# Patient Record
Sex: MALE | Race: WHITE | NOT HISPANIC OR LATINO | URBAN - METROPOLITAN AREA
[De-identification: names, ages, dates, MRNs, and addresses within clinical notes are randomized per-mention and may not be internally consistent; named-entity substitution may affect disease eponyms.]

---

## 2021-04-05 DIAGNOSIS — Z23 ENCOUNTER FOR IMMUNIZATION: ICD-10-CM

## 2021-04-18 ENCOUNTER — IMMUNIZATIONS (OUTPATIENT)
Dept: FAMILY MEDICINE CLINIC | Facility: HOSPITAL | Age: 56
End: 2021-04-18

## 2021-04-18 DIAGNOSIS — Z23 ENCOUNTER FOR IMMUNIZATION: Primary | ICD-10-CM

## 2021-04-18 PROCEDURE — 0001A SARS-COV-2 / COVID-19 MRNA VACCINE (PFIZER-BIONTECH) 30 MCG: CPT

## 2021-04-18 PROCEDURE — 91300 SARS-COV-2 / COVID-19 MRNA VACCINE (PFIZER-BIONTECH) 30 MCG: CPT

## 2021-05-15 ENCOUNTER — IMMUNIZATIONS (OUTPATIENT)
Dept: FAMILY MEDICINE CLINIC | Facility: HOSPITAL | Age: 56
End: 2021-05-15

## 2021-05-15 DIAGNOSIS — Z23 ENCOUNTER FOR IMMUNIZATION: Primary | ICD-10-CM

## 2021-05-15 PROCEDURE — 91300 SARS-COV-2 / COVID-19 MRNA VACCINE (PFIZER-BIONTECH) 30 MCG: CPT

## 2021-05-15 PROCEDURE — 0002A SARS-COV-2 / COVID-19 MRNA VACCINE (PFIZER-BIONTECH) 30 MCG: CPT

## 2021-11-10 ENCOUNTER — HOSPITAL ENCOUNTER (OUTPATIENT)
Facility: HOSPITAL | Age: 56
Setting detail: HOSPITAL OUTPATIENT SURGERY
End: 2021-11-10
Attending: ORTHOPAEDIC SURGERY | Admitting: ORTHOPAEDIC SURGERY
Payer: COMMERCIAL

## 2021-11-23 ENCOUNTER — TRANSCRIBE ORDERS (OUTPATIENT)
Dept: SCHEDULING | Age: 56
End: 2021-11-23
Payer: COMMERCIAL

## 2021-11-23 DIAGNOSIS — Z11.59 ENCOUNTER FOR SCREENING FOR OTHER VIRAL DISEASES: Primary | ICD-10-CM

## 2021-12-14 VITALS — WEIGHT: 185 LBS | HEIGHT: 68 IN | BODY MASS INDEX: 28.04 KG/M2

## 2021-12-21 ENCOUNTER — OFFICE VISIT (OUTPATIENT)
Dept: PREADMISSION TESTING | Facility: HOSPITAL | Age: 56
End: 2021-12-21
Attending: ORTHOPAEDIC SURGERY
Payer: COMMERCIAL

## 2021-12-21 ENCOUNTER — HOSPITAL ENCOUNTER (OUTPATIENT)
Dept: CARDIOLOGY | Facility: HOSPITAL | Age: 56
Discharge: HOME | End: 2021-12-21
Attending: HOSPITALIST
Payer: COMMERCIAL

## 2021-12-21 VITALS
BODY MASS INDEX: 28.93 KG/M2 | OXYGEN SATURATION: 100 % | HEART RATE: 98 BPM | SYSTOLIC BLOOD PRESSURE: 124 MMHG | TEMPERATURE: 98.7 F | DIASTOLIC BLOOD PRESSURE: 78 MMHG | WEIGHT: 190.9 LBS | HEIGHT: 68 IN | RESPIRATION RATE: 18 BRPM

## 2021-12-21 DIAGNOSIS — Z72.0 TOBACCO USE: ICD-10-CM

## 2021-12-21 DIAGNOSIS — H35.30 MACULAR DEGENERATION, UNSPECIFIED LATERALITY, UNSPECIFIED TYPE: ICD-10-CM

## 2021-12-21 DIAGNOSIS — Z01.818 PRE-OP EVALUATION: ICD-10-CM

## 2021-12-21 DIAGNOSIS — D75.89 MACROCYTOSIS WITHOUT ANEMIA: ICD-10-CM

## 2021-12-21 DIAGNOSIS — M17.12 PRIMARY OSTEOARTHRITIS OF LEFT KNEE: ICD-10-CM

## 2021-12-21 DIAGNOSIS — Z01.818 PRE-OP EVALUATION: Primary | ICD-10-CM

## 2021-12-21 LAB
ABO + RH BLD: NORMAL
ANION GAP SERPL CALC-SCNC: 11 MEQ/L (ref 3–15)
APTT PPP: 31 SEC (ref 23–35)
ATRIAL RATE: 91
BLD GP AB SCN SERPL QL: NEGATIVE
BUN SERPL-MCNC: 11 MG/DL (ref 8–20)
CALCIUM SERPL-MCNC: 10.1 MG/DL (ref 8.9–10.3)
CHLORIDE SERPL-SCNC: 102 MEQ/L (ref 98–109)
CO2 SERPL-SCNC: 26 MEQ/L (ref 22–32)
CREAT SERPL-MCNC: 0.7 MG/DL (ref 0.8–1.3)
D AG BLD QL: POSITIVE
ERYTHROCYTE [DISTWIDTH] IN BLOOD BY AUTOMATED COUNT: 13 % (ref 11.6–14.4)
GFR SERPL CREATININE-BSD FRML MDRD: >60 ML/MIN/1.73M*2
GLUCOSE SERPL-MCNC: 96 MG/DL (ref 70–99)
HCT VFR BLDCO AUTO: 46.2 % (ref 40.1–51)
HGB BLD-MCNC: 15.8 G/DL (ref 13.7–17.5)
INR PPP: 1
LABORATORY COMMENT REPORT: NORMAL
MCH RBC QN AUTO: 35.5 PG (ref 28–33.2)
MCHC RBC AUTO-ENTMCNC: 34.2 G/DL (ref 32.2–36.5)
MCV RBC AUTO: 103.8 FL (ref 83–98)
P AXIS: 73
PDW BLD AUTO: 9.3 FL (ref 9.4–12.4)
PLATELET # BLD AUTO: 241 K/UL (ref 150–350)
POTASSIUM SERPL-SCNC: 4.8 MEQ/L (ref 3.6–5.1)
PR INTERVAL: 150
PROTHROMBIN TIME: 13.1 SEC (ref 12.2–14.5)
QRS DURATION: 92
QT INTERVAL: 380
QTC CALCULATION(BAZETT): 467
R AXIS: 37
RBC # BLD AUTO: 4.45 M/UL (ref 4.5–5.8)
SODIUM SERPL-SCNC: 139 MEQ/L (ref 136–144)
SPECIMEN EXP DATE BLD: NORMAL
T WAVE AXIS: 34
VENTRICULAR RATE: 91
WBC # BLD AUTO: 4.9 K/UL (ref 3.8–10.5)

## 2021-12-21 PROCEDURE — 85610 PROTHROMBIN TIME: CPT | Performed by: HOSPITALIST

## 2021-12-21 PROCEDURE — 99204 OFFICE O/P NEW MOD 45 MIN: CPT | Performed by: HOSPITALIST

## 2021-12-21 PROCEDURE — 85027 COMPLETE CBC AUTOMATED: CPT | Performed by: HOSPITALIST

## 2021-12-21 PROCEDURE — 3008F BODY MASS INDEX DOCD: CPT | Performed by: HOSPITALIST

## 2021-12-21 PROCEDURE — 86901 BLOOD TYPING SEROLOGIC RH(D): CPT

## 2021-12-21 PROCEDURE — 80048 BASIC METABOLIC PNL TOTAL CA: CPT | Performed by: HOSPITALIST

## 2021-12-21 PROCEDURE — 36415 COLL VENOUS BLD VENIPUNCTURE: CPT | Performed by: HOSPITALIST

## 2021-12-21 PROCEDURE — 86900 BLOOD TYPING SEROLOGIC ABO: CPT

## 2021-12-21 PROCEDURE — 93005 ELECTROCARDIOGRAM TRACING: CPT

## 2021-12-21 PROCEDURE — 85730 THROMBOPLASTIN TIME PARTIAL: CPT | Performed by: HOSPITALIST

## 2021-12-21 ASSESSMENT — PAIN SCALES - GENERAL: PAINLEVEL: 2

## 2021-12-21 NOTE — ASSESSMENT & PLAN NOTE
L TKA planned for 1/4/2022  See below for statement in regards to perioperative risk  The patient was instructed to stop taking any NSAIDS per the surgeon's discretion  Recommend DVT prophylaxis at the discretion of the surgeon  Incentive spirometry and early ambulation post op  Post op bowel regimen  If present, recommend removal of Loredo catheter as early as possible to prevent infection  Monitor CBC, BMP, and volume status in the perioperative period

## 2021-12-21 NOTE — CONSULTS
[facility] Medicine Service -  Pre-Operative Consultation       Patient Name: Julian Banda  Referring Surgeon: Dr. Ignacio Antoine    Reason for Referral: Pre-Operative Evaluation  Surgical Procedure: left total knee arthroplasty  Operative Date: 1/4/2022    PCP: Pt States, No Pcp        HISTORY OF PRESENT ILLNESS      Julian Banda is a 56 y.o. male presenting today to the [facility] at [place] for pre-operative evaluation prior to planned surgery.    Regarding the current issue:  Julian had his right hip replaced about 5 years ago.  This went great.  But he has had increasing issues with his left knee.  He especially has issues when he has to carry anything and there is increased weight on the knee.   He is not on pain meds.      Regarding the pre-op eval:  Overall, the patient has been feeling in good health without issues of chest pain/pressure, dyspnea, or recent hospitalizations/significant infections.     Functionally, the patient is able to ascend a flight of stairs with no dyspnea or chest pain.   Functional capacity is good,  > 4 METS.    The patient denies, on specific questioning, the following:  No history of MI, arrhythmia, valvular heart disease or CHF.  No history of PARTHA.  No history of DVT/PE.  No history of COPD.  No history of CVA or TIA.  No history of DM or insulin use.   No history of CKD.     No history of difficult airway/difficult intubation.  No history of adverse reaction to anesthesia in the past.    Additional comments in regards to the patient's additional medical history:    None      PAST MEDICAL AND SURGICAL HISTORY      Past Medical History:   Diagnosis Date   • Arthritis     Left Knee and Right Hip    • COVID-19 vaccine series completed     Pfizer X 3    • Macular degeneration        Past Surgical History:   Procedure Laterality Date   • JOINT REPLACEMENT Right 2016    Hip       MEDICATIONS        Current  "Outpatient Medications:   •  aflibercept (EYLEA VTRE), by intravitreal route. Every 4 weeks left eye then 4 weeks for right eye ongoing , Disp: , Rfl:   •  ascorbic acid (VITAMIN C ORAL), Take 1 tablet by mouth every morning., Disp: , Rfl:   •  folic acid/multivit-min/lutein (CENTRUM SILVER ORAL), Take 1 tablet by mouth every morning., Disp: , Rfl:   •  vit A/vit C/vit E/zinc/copper (PRESERVISION AREDS ORAL), Take 1 tablet by mouth every morning., Disp: , Rfl:   •  vitamin E acetate (VITAMIN E ORAL), Take 1 tablet by mouth every morning., Disp: , Rfl:     ALLERGIES      Patient has no known allergies.    FAMILY HISTORY        Denies any prior known family history of DVTs/PEs/clotting disorder    SOCIAL HISTORY      Social History     Tobacco Use   • Smoking status: Current Some Day Smoker     Types: Cigars   • Smokeless tobacco: Never Used   • Tobacco comment: Ocassional Cigar    Substance Use Topics   • Alcohol use: Yes     Comment: Daily 2 glasses Wine    • Drug use: Never       REVIEW OF SYSTEMS      All other systems reviewed and negative except as noted in HPI    PHYSICAL EXAMINATION      Visit Vitals  /78   Pulse 98   Temp 37.1 °C (98.7 °F) (Oral)   Resp 18   Ht 1.727 m (5' 8\")   Wt 86.6 kg (190 lb 14.4 oz)   SpO2 100%   BMI 29.03 kg/m²     Body mass index is 29.03 kg/m².    Physical Exam  General: well-appearing WM, nontoxic appearing, no acute distress  HEENT: Moist membranes, PERRL, anicteric sclera   Neck: supple, no JVD  Cardiac: RRR, no murmur, no rub   Lungs: clear bilaterally, no wheezing/rales/rhonchi  Abdomen: soft, NT/ND, +BS, no rebound/guarding   Extremities: no edema, distal perfusion intact  MSK: left knee crepitus on flexion/extension  Neuro: AAOx3, nonfocal. CN's intact grossly. No focal motor deficit. No sensory deficit.  Skin: no rash. Clean, dry, intact.   Psych: cooperative    LABS / EKG        Labs  Today's labs reviewed. No issues.     Lab Results   Component Value Date     " 12/21/2021    K 4.8 12/21/2021     12/21/2021    BUN 11 12/21/2021    GLUCOSE 96 12/21/2021    CREATININE 0.7 (L) 12/21/2021    WBC 4.90 12/21/2021    HGB 15.8 12/21/2021    HCT 46.2 12/21/2021     12/21/2021    INR 1.0 12/21/2021         ECG   Reviewed. 12/21/2021 -- NSR w/ PACs/PVCs    ASSESSMENT AND PLAN         Tobacco use  Pt is daily cigar smoker  Advised cessation, particularly in week leading up to surgery    Macular degeneration  Pt gets intra-vitreal injections every 4 weeks  Ongoing outpatient f/u    Primary osteoarthritis of left knee  L TKA planned for 1/4/2022  See below for statement in regards to perioperative risk  The patient was instructed to stop taking any NSAIDS per the surgeon's discretion  Recommend DVT prophylaxis at the discretion of the surgeon  Incentive spirometry and early ambulation post op  Post op bowel regimen  If present, recommend removal of Loredo catheter as early as possible to prevent infection  Monitor CBC, BMP, and volume status in the perioperative period    Macrocytosis without anemia  ; can be worked up as outpatient  Recheck post-op  Hgb normal       In regards to perioperative cardiac risk:  The patient denies any history of ischemic heart disease, denies any history of CHF, denies any history of CVA, is not on pre-operative treatment with insulin, and does not have a pre-operative creatinine > 2 mg/dL.   The Revised Cardiac Risk Index (RCRI) = 0 for this patient which indicates a 0.4% risk of major adverse cardiac event in the perioperative period for this intermediate risk procedure.     PARTHA screening:  STOP-BANG screening for obstructive sleep apnea = 2, which indicates the patient is low risk for having underlying PARTHA.     Additional comments:  - no meds on AM of surgery  - stop all supplements 2 weeks prior to OR       Please do not hesitate to contact [facility] during the upcoming hospitalization with any questions or concerns.     Martha Victor,  MD  12/22/2021

## 2021-12-21 NOTE — PRE-PROCEDURE INSTRUCTIONS
1. You will be called between 3pm-7pm one business day before your procedure to tell you where and when to report. If you do not receive a phone call by 7pm, please call the Admissions office at [phone].    2. Please report to Admissions or Short Procedure Unit on the day of your procedure.   Detailed directions will be given to you when you are called with arrival time.        3. Please follow the following fasting guidelines:     No solid food EIGHT HOURS prior to surgery.  Unlimited clear liquids, meaning water or PLAIN black coffee WITHOUT any milk, cream, sugar, or sweetener are permitted up to TWO HOURS prior to arrival at the hospital.    4. Early on the morning of the procedure please take your usual dose of the listed medications with a sip of water: no meds per Dr Victor  Hold supplements 2 weeks prior to surgery per Dr Victor      5. Other Instructions: You may brush your teeth the morning of the procedure. Rinse and spit, do not swallow.  Bring a list of your medications with dosages.  Use surgical wash as directed.     6. If you develop a cold, cough, fever, rash, or other symptom prior to the day of the procedure, please report it to your physician immediately.    7. If you need to cancel the procedure for any reason, please contact your physician.    8. Make arrangements to have someone drive you home from the procedure. If you have not arranged for transportation home, your surgery may be cancelled.     9. You may not take public transportation unless accompanied by a responsible person.    10. You may not drive a car or operate complex or potentially dangerous machinery for 24 hours following anesthesia and/or sedation.    11.  If it is medically necessary for you to have a longer stay, you will be informed as soon as the decision is made.    12. Only bring essential items to the hospital.  Do not wear or bring anything of value to the hospital including jewelry of any kind, money, or wallet. Do  not wear make-up or contact lenses.  DO NOT BRING MEDICATIONS FROM HOME unless instructed to do so. DO bring your hearing aids, glasses, and a case    13. No lotion, creams, powders, or oils on skin the morning of procedure     14. Dress in comfortable clothes.    15.  If instructed, please bring a copy of your Advanced Directive (Living Will/Durable Power of ) on the day of your procedure.     16. Patients need to quarantine from the time of PAT COVID test to day of surgery, regardless of COVID vaccine status.      17. Ensuring your safety at all times is a very important part of our [facility] Culture of Safety. After having surgery and sedation, you are at risk for falling and balance issues. Although you may feel awake, the effects of the medication can last up to 24 hours after anesthesia. If you need to use the bathroom during your recovery period, nursing staff will escort you there and stay with you to ensure your safety.    18. Refrain from drinking alcohol and smoking cigarettes for 24 hours prior to surgery.    19. Shower with antibacterial soap (Dial) the night before and morning of your procedure.  If your procedure indicates the need for CHG antiseptic wash (Bactoshield or Hibiclens), please use this instead and follow instructions as discussed at the time of your Pre-Admission Testing phone interview or visit.    Above instructions reviewed with patient and patient acknowledges understanding.    Form explained by: Kristan Marino RN

## 2021-12-22 PROBLEM — D75.89 MACROCYTOSIS WITHOUT ANEMIA: Status: ACTIVE | Noted: 2021-12-22

## 2021-12-30 ENCOUNTER — APPOINTMENT (OUTPATIENT)
Dept: LAB | Facility: HOSPITAL | Age: 56
End: 2021-12-30
Attending: ORTHOPAEDIC SURGERY
Payer: COMMERCIAL

## 2021-12-30 DIAGNOSIS — Z11.59 ENCOUNTER FOR SCREENING FOR OTHER VIRAL DISEASES: ICD-10-CM

## 2021-12-30 LAB — SARS-COV-2 RNA RESP QL NAA+PROBE: NEGATIVE

## 2021-12-30 PROCEDURE — C9803 HOPD COVID-19 SPEC COLLECT: HCPCS

## 2021-12-30 PROCEDURE — U0003 INFECTIOUS AGENT DETECTION BY NUCLEIC ACID (DNA OR RNA); SEVERE ACUTE RESPIRATORY SYNDROME CORONAVIRUS 2 (SARS-COV-2) (CORONAVIRUS DISEASE [COVID-19]), AMPLIFIED PROBE TECHNIQUE, MAKING USE OF HIGH THROUGHPUT TECHNOLOGIES AS DESCRIBED BY CMS-2020-01-R: HCPCS

## 2022-01-30 ENCOUNTER — APPOINTMENT (OUTPATIENT)
Dept: LAB | Facility: HOSPITAL | Age: 57
End: 2022-01-30
Attending: ORTHOPAEDIC SURGERY
Payer: COMMERCIAL

## 2022-02-08 ENCOUNTER — TRANSCRIBE ORDERS (OUTPATIENT)
Dept: SCHEDULING | Age: 57
End: 2022-02-08

## 2022-02-08 DIAGNOSIS — Z11.59 ENCOUNTER FOR SCREENING FOR OTHER VIRAL DISEASES: Primary | ICD-10-CM

## 2022-02-14 ASSESSMENT — PAIN SCALES - GENERAL: PAINLEVEL: 4

## 2022-02-18 ENCOUNTER — APPOINTMENT (OUTPATIENT)
Dept: LAB | Facility: HOSPITAL | Age: 57
End: 2022-02-18
Attending: ORTHOPAEDIC SURGERY
Payer: COMMERCIAL

## 2022-02-18 ENCOUNTER — OFFICE VISIT (OUTPATIENT)
Dept: PREADMISSION TESTING | Facility: HOSPITAL | Age: 57
End: 2022-02-18
Attending: ORTHOPAEDIC SURGERY
Payer: COMMERCIAL

## 2022-02-18 ENCOUNTER — HOSPITAL ENCOUNTER (OUTPATIENT)
Dept: CARDIOLOGY | Facility: HOSPITAL | Age: 57
Discharge: HOME | End: 2022-02-18
Attending: ORTHOPAEDIC SURGERY
Payer: COMMERCIAL

## 2022-02-18 ENCOUNTER — TRANSCRIBE ORDERS (OUTPATIENT)
Dept: SCHEDULING | Age: 57
End: 2022-02-18

## 2022-02-18 VITALS
RESPIRATION RATE: 16 BRPM | BODY MASS INDEX: 28.79 KG/M2 | TEMPERATURE: 98.8 F | DIASTOLIC BLOOD PRESSURE: 81 MMHG | HEIGHT: 68 IN | WEIGHT: 190 LBS | SYSTOLIC BLOOD PRESSURE: 132 MMHG | OXYGEN SATURATION: 98 % | HEART RATE: 94 BPM

## 2022-02-18 DIAGNOSIS — Z72.0 TOBACCO USE: ICD-10-CM

## 2022-02-18 DIAGNOSIS — Z01.818 PRE-OP EVALUATION: Primary | ICD-10-CM

## 2022-02-18 DIAGNOSIS — D75.89 MACROCYTOSIS WITHOUT ANEMIA: ICD-10-CM

## 2022-02-18 DIAGNOSIS — H35.30 MACULAR DEGENERATION, UNSPECIFIED LATERALITY, UNSPECIFIED TYPE: ICD-10-CM

## 2022-02-18 DIAGNOSIS — M17.12 UNILATERAL PRIMARY OSTEOARTHRITIS, LEFT KNEE: ICD-10-CM

## 2022-02-18 DIAGNOSIS — M17.12 PRIMARY OSTEOARTHRITIS OF LEFT KNEE: ICD-10-CM

## 2022-02-18 DIAGNOSIS — M17.12 UNILATERAL PRIMARY OSTEOARTHRITIS, LEFT KNEE: Primary | ICD-10-CM

## 2022-02-18 LAB
ABO + RH BLD: NORMAL
ANION GAP SERPL CALC-SCNC: 9 MEQ/L (ref 3–15)
APTT PPP: 28 SEC (ref 23–35)
BLD GP AB SCN SERPL QL: NEGATIVE
BUN SERPL-MCNC: 12 MG/DL (ref 8–20)
CALCIUM SERPL-MCNC: 9.5 MG/DL (ref 8.9–10.3)
CHLORIDE SERPL-SCNC: 101 MEQ/L (ref 98–109)
CO2 SERPL-SCNC: 27 MEQ/L (ref 22–32)
CREAT SERPL-MCNC: 0.8 MG/DL (ref 0.8–1.3)
D AG BLD QL: POSITIVE
ERYTHROCYTE [DISTWIDTH] IN BLOOD BY AUTOMATED COUNT: 13.2 % (ref 11.6–14.4)
GFR SERPL CREATININE-BSD FRML MDRD: >60 ML/MIN/1.73M*2
GLUCOSE SERPL-MCNC: 119 MG/DL (ref 70–99)
HCT VFR BLDCO AUTO: 43.2 % (ref 40.1–51)
HGB BLD-MCNC: 14.5 G/DL (ref 13.7–17.5)
INR PPP: 1
LABORATORY COMMENT REPORT: NORMAL
MCH RBC QN AUTO: 34.5 PG (ref 28–33.2)
MCHC RBC AUTO-ENTMCNC: 33.6 G/DL (ref 32.2–36.5)
MCV RBC AUTO: 102.9 FL (ref 83–98)
PDW BLD AUTO: 8.5 FL (ref 9.4–12.4)
PLATELET # BLD AUTO: 186 K/UL (ref 150–350)
POTASSIUM SERPL-SCNC: 4.9 MEQ/L (ref 3.6–5.1)
PROTHROMBIN TIME: 12.6 SEC (ref 12.2–14.5)
RBC # BLD AUTO: 4.2 M/UL (ref 4.5–5.8)
SODIUM SERPL-SCNC: 137 MEQ/L (ref 136–144)
SPECIMEN EXP DATE BLD: NORMAL
WBC # BLD AUTO: 4.1 K/UL (ref 3.8–10.5)

## 2022-02-18 PROCEDURE — 99214 OFFICE O/P EST MOD 30 MIN: CPT | Performed by: HOSPITALIST

## 2022-02-18 PROCEDURE — 36415 COLL VENOUS BLD VENIPUNCTURE: CPT

## 2022-02-18 PROCEDURE — 85730 THROMBOPLASTIN TIME PARTIAL: CPT

## 2022-02-18 PROCEDURE — 85027 COMPLETE CBC AUTOMATED: CPT

## 2022-02-18 PROCEDURE — 86901 BLOOD TYPING SEROLOGIC RH(D): CPT

## 2022-02-18 PROCEDURE — 3008F BODY MASS INDEX DOCD: CPT | Performed by: HOSPITALIST

## 2022-02-18 PROCEDURE — 85610 PROTHROMBIN TIME: CPT

## 2022-02-18 PROCEDURE — 80048 BASIC METABOLIC PNL TOTAL CA: CPT

## 2022-02-18 ASSESSMENT — PAIN SCALES - GENERAL: PAINLEVEL: 2

## 2022-02-18 NOTE — PRE-PROCEDURE INSTRUCTIONS
1. You will be called between 3pm-7pm one business day before your procedure to tell you where and when to report. If you do not receive a phone call by 7pm, please call the Admissions office at [phone].    2. Please report to Admissions or Short Procedure Unit on the day of your procedure.   Detailed directions will be given to you when you are called with arrival time.        3. Please follow the following fasting guidelines:     No solid food EIGHT HOURS prior to surgery.  Unlimited clear liquids, meaning water or PLAIN black coffee WITHOUT any milk, cream, sugar, or sweetener are permitted up to TWO HOURS prior to arrival at the hospital.    4. Early on the morning of the procedure please take your usual dose of the listed medications with a sip of water:  No medications on the morning of surgery as per Dr Victor    5. Other Instructions: You may brush your teeth the morning of the procedure. Rinse and spit, do not swallow.  Bring a list of your medications with dosages.  Use surgical wash as directed.     6. If you develop a cold, cough, fever, rash, or other symptom prior to the day of the procedure, please report it to your physician immediately.    7. If you need to cancel the procedure for any reason, please contact your physician.    8. Make arrangements to have someone drive you home from the procedure. If you have not arranged for transportation home, your surgery may be cancelled.     9. You may not take public transportation unless accompanied by a responsible person.    10. You may not drive a car or operate complex or potentially dangerous machinery for 24 hours following anesthesia and/or sedation.    11.  If it is medically necessary for you to have a longer stay, you will be informed as soon as the decision is made.    12. Only bring essential items to the hospital.  Do not wear or bring anything of value to the hospital including jewelry of any kind, money, or wallet. Do not wear make-up or  contact lenses.  DO NOT BRING MEDICATIONS FROM HOME unless instructed to do so. DO bring your hearing aids, glasses, and a case    13. No lotion, creams, powders, or oils on skin the morning of procedure     14. Dress in comfortable clothes.    15.  If instructed, please bring a copy of your Advanced Directive (Living Will/Durable Power of ) on the day of your procedure.     16. Patients need to quarantine from the time of PAT COVID test to day of surgery, regardless of COVID vaccine status.      17. Ensuring your safety at all times is a very important part of our [facility] Culture of Safety. After having surgery and sedation, you are at risk for falling and balance issues. Although you may feel awake, the effects of the medication can last up to 24 hours after anesthesia. If you need to use the bathroom during your recovery period, nursing staff will escort you there and stay with you to ensure your safety.    18. Refrain from drinking alcohol and smoking cigarettes for 24 hours prior to surgery.    19. Shower with antibacterial soap (Dial) the night before and morning of your procedure.  If your procedure indicates the need for CHG antiseptic wash (Bactoshield or Hibiclens), please use this instead and follow instructions as discussed at the time of your Pre-Admission Testing phone interview or visit.    Above instructions reviewed with patient and patient acknowledges understanding.    Form explained by: Luz Charles LPN

## 2022-02-18 NOTE — H&P (VIEW-ONLY)
[facility] Medicine Service -  Pre-Operative Consultation       Patient Name: Julian Banda  Referring Surgeon: Dr. Ignacio Antoine    Reason for Referral: Pre-Operative Evaluation  Surgical Procedure: left total knee arthroplasty  Operative Date: 3/1/2022    PCP: Pt States, No Pcp        HISTORY OF PRESENT ILLNESS      Julian Banda is a 56 y.o. male presenting today to the [facility] at [place] for pre-operative evaluation prior to planned surgery.    Regarding the current issue:  Julian had his right hip replaced about 5 years ago.  This went great.  But he has had increasing issues with his left knee.  He especially has issues when he has to carry anything and there is increased weight on the knee.   He is not on pain meds.      Julian was originally scheduled for surgery in early Jan 2022.  It was postponed due to the Omicron surge.  He has had no changes in his health since our PAT visit on 12/21/21.      Regarding the pre-op eval:  Overall, the patient has been feeling in good health without issues of chest pain/pressure, dyspnea, or recent hospitalizations/significant infections.     Functionally, the patient is able to ascend a flight of stairs with no dyspnea or chest pain.   Functional capacity is good,  > 4 METS.    The patient denies, on specific questioning, the following:  No history of MI, arrhythmia, valvular heart disease or CHF.  No history of PARTHA.  No history of DVT/PE.  No history of COPD.  No history of CVA or TIA.  No history of DM or insulin use.   No history of CKD.     No history of difficult airway/difficult intubation.  No history of adverse reaction to anesthesia in the past.    Additional comments in regards to the patient's additional medical history:    None      PAST MEDICAL AND SURGICAL HISTORY      Past Medical History:   Diagnosis Date   • Arthritis     Left Knee and Right Hip    • COVID-19 vaccine series completed   "   Pfizer X 3    • Macular degeneration    • Wears contact lenses        Past Surgical History:   Procedure Laterality Date   • JOINT REPLACEMENT Right 2016    Hip       MEDICATIONS        Current Outpatient Medications:   •  aflibercept (EYLEA VTRE), by intravitreal route every 30 (thirty) days. Every 4 weeks left eye then 4 weeks for right eye ongoing.  Patient received last dose 2/11/22 in right eye. , Disp: , Rfl:   •  ascorbic acid (VITAMIN C ORAL), Take 250 mg by mouth every morning. Stopped 2/14/2022 , Disp: , Rfl:   •  folic acid/multivit-min/lutein (CENTRUM SILVER ORAL), Take 1 tablet by mouth every morning. Stopped 2/14/2022 , Disp: , Rfl:   •  vit A/vit C/vit E/zinc/copper (PRESERVISION AREDS ORAL), Take 2 tablets by mouth every morning. Stopped 2/14/2022 , Disp: , Rfl:   •  vitamin E acetate (VITAMIN E ORAL), Take 1 tablet by mouth every morning. Stopped 2/14/2022 , Disp: , Rfl:     ALLERGIES      Patient has no known allergies.    FAMILY HISTORY        Denies any prior known family history of DVTs/PEs/clotting disorder    SOCIAL HISTORY      Social History     Tobacco Use   • Smoking status: Current Some Day Smoker     Types: Cigars   • Smokeless tobacco: Never Used   • Tobacco comment: Ocassional Cigar    Vaping Use   • Vaping Use: Never used   Substance Use Topics   • Alcohol use: Yes     Alcohol/week: 14.0 standard drinks     Types: 14 Glasses of wine per week     Comment: Daily 2 glasses Wine    • Drug use: Never       REVIEW OF SYSTEMS      All other systems reviewed and negative except as noted in HPI    PHYSICAL EXAMINATION      Visit Vitals  /81   Pulse 94   Temp 37.1 °C (98.8 °F) (Oral)   Resp 16   Ht 1.727 m (5' 8\")   Wt 86.2 kg (190 lb)   SpO2 98%   BMI 28.89 kg/m²     Body mass index is 28.89 kg/m².    Physical Exam  General: well-appearing M, nontoxic appearing, no acute distress  HEENT: Moist membranes, PERRL, anicteric sclera   Neck: supple, no JVD  Cardiac: RRR, no murmur, no rub "   Lungs: clear bilaterally, no wheezing/rales/rhonchi  Abdomen: soft, NT/ND, +BS, no rebound/guarding   Extremities: no edema, distal perfusion intact  MSK: crepitus and restricted ROM of left knee joint  Neuro: AAOx3, nonfocal. CN's intact grossly. No focal motor deficit. No sensory deficit.  Skin: no rash. Clean, dry, intact.   Psych: cooperative    LABS / EKG        Labs  Today's labs reviewed. No issues.     Lab Results   Component Value Date     02/18/2022    K 4.9 02/18/2022     02/18/2022    BUN 12 02/18/2022    GLUCOSE 119 (H) 02/18/2022    CREATININE 0.8 02/18/2022    WBC 4.10 02/18/2022    HGB 14.5 02/18/2022    HCT 43.2 02/18/2022     02/18/2022    INR 1.0 02/18/2022         ECG   Reviewed. 12/21/2021 -- NSR w/ PACs/PVCs    ASSESSMENT AND PLAN         Tobacco use  Pt is daily cigar smoker  Advised cessation, particularly in week leading up to surgery    Macular degeneration  Pt gets intra-vitreal injections every 4 weeks  Ongoing outpatient f/u    Primary osteoarthritis of left knee  L TKA planned for 3/1/2022  See below for statement in regards to perioperative risk  The patient was instructed to stop taking any NSAIDS per the surgeon's discretion  Recommend DVT prophylaxis at the discretion of the surgeon  Incentive spirometry and early ambulation post op  Post op bowel regimen  If present, recommend removal of Loredo catheter as early as possible to prevent infection  Monitor CBC, BMP, and volume status in the perioperative period    Macrocytosis without anemia  ; can be worked up as outpatient  Recheck post-op  Hgb normal       In regards to perioperative cardiac risk:  The patient denies any history of ischemic heart disease, denies any history of CHF, denies any history of CVA, is not on pre-operative treatment with insulin, and does not have a pre-operative creatinine > 2 mg/dL.   The Revised Cardiac Risk Index (RCRI) = 0 for this patient which indicates a 0.4% risk of  major adverse cardiac event in the perioperative period for this intermediate risk procedure.     PARTHA screening:  STOP-BANG screening for obstructive sleep apnea = 2, which indicates the patient is low risk for having underlying PARTHA.     Additional comments:  - no meds on AM of surgery  - stop all supplements 2 weeks prior to OR      Please do not hesitate to contact [facility] during [place] upcoming [place] with any questions or concerns.     Martha Victor MD  2/18/2022

## 2022-02-18 NOTE — CONSULTS
[facility] Medicine Service -  Pre-Operative Consultation       Patient Name: Julian Banda  Referring Surgeon: Dr. Ignacio Antoine    Reason for Referral: Pre-Operative Evaluation  Surgical Procedure: left total knee arthroplasty  Operative Date: 3/1/2022    PCP: Pt States, No Pcp        HISTORY OF PRESENT ILLNESS      Julian Banda is a 56 y.o. male presenting today to the [facility] at [place] for pre-operative evaluation prior to planned surgery.    Regarding the current issue:  Julian had his right hip replaced about 5 years ago.  This went great.  But he has had increasing issues with his left knee.  He especially has issues when he has to carry anything and there is increased weight on the knee.   He is not on pain meds.      Julian was originally scheduled for surgery in early Jan 2022.  It was postponed due to the Omicron surge.  He has had no changes in his health since our PAT visit on 12/21/21.      Regarding the pre-op eval:  Overall, the patient has been feeling in good health without issues of chest pain/pressure, dyspnea, or recent hospitalizations/significant infections.     Functionally, the patient is able to ascend a flight of stairs with no dyspnea or chest pain.   Functional capacity is good,  > 4 METS.    The patient denies, on specific questioning, the following:  No history of MI, arrhythmia, valvular heart disease or CHF.  No history of PARTHA.  No history of DVT/PE.  No history of COPD.  No history of CVA or TIA.  No history of DM or insulin use.   No history of CKD.     No history of difficult airway/difficult intubation.  No history of adverse reaction to anesthesia in the past.    Additional comments in regards to the patient's additional medical history:    None      PAST MEDICAL AND SURGICAL HISTORY      Past Medical History:   Diagnosis Date   • Arthritis     Left Knee and Right Hip    • COVID-19 vaccine series completed   "   Pfizer X 3    • Macular degeneration    • Wears contact lenses        Past Surgical History:   Procedure Laterality Date   • JOINT REPLACEMENT Right 2016    Hip       MEDICATIONS        Current Outpatient Medications:   •  aflibercept (EYLEA VTRE), by intravitreal route every 30 (thirty) days. Every 4 weeks left eye then 4 weeks for right eye ongoing.  Patient received last dose 2/11/22 in right eye. , Disp: , Rfl:   •  ascorbic acid (VITAMIN C ORAL), Take 250 mg by mouth every morning. Stopped 2/14/2022 , Disp: , Rfl:   •  folic acid/multivit-min/lutein (CENTRUM SILVER ORAL), Take 1 tablet by mouth every morning. Stopped 2/14/2022 , Disp: , Rfl:   •  vit A/vit C/vit E/zinc/copper (PRESERVISION AREDS ORAL), Take 2 tablets by mouth every morning. Stopped 2/14/2022 , Disp: , Rfl:   •  vitamin E acetate (VITAMIN E ORAL), Take 1 tablet by mouth every morning. Stopped 2/14/2022 , Disp: , Rfl:     ALLERGIES      Patient has no known allergies.    FAMILY HISTORY        Denies any prior known family history of DVTs/PEs/clotting disorder    SOCIAL HISTORY      Social History     Tobacco Use   • Smoking status: Current Some Day Smoker     Types: Cigars   • Smokeless tobacco: Never Used   • Tobacco comment: Ocassional Cigar    Vaping Use   • Vaping Use: Never used   Substance Use Topics   • Alcohol use: Yes     Alcohol/week: 14.0 standard drinks     Types: 14 Glasses of wine per week     Comment: Daily 2 glasses Wine    • Drug use: Never       REVIEW OF SYSTEMS      All other systems reviewed and negative except as noted in HPI    PHYSICAL EXAMINATION      Visit Vitals  /81   Pulse 94   Temp 37.1 °C (98.8 °F) (Oral)   Resp 16   Ht 1.727 m (5' 8\")   Wt 86.2 kg (190 lb)   SpO2 98%   BMI 28.89 kg/m²     Body mass index is 28.89 kg/m².    Physical Exam  General: well-appearing M, nontoxic appearing, no acute distress  HEENT: Moist membranes, PERRL, anicteric sclera   Neck: supple, no JVD  Cardiac: RRR, no murmur, no rub "   Lungs: clear bilaterally, no wheezing/rales/rhonchi  Abdomen: soft, NT/ND, +BS, no rebound/guarding   Extremities: no edema, distal perfusion intact  MSK: crepitus and restricted ROM of left knee joint  Neuro: AAOx3, nonfocal. CN's intact grossly. No focal motor deficit. No sensory deficit.  Skin: no rash. Clean, dry, intact.   Psych: cooperative    LABS / EKG        Labs  Today's labs reviewed. No issues.     Lab Results   Component Value Date     02/18/2022    K 4.9 02/18/2022     02/18/2022    BUN 12 02/18/2022    GLUCOSE 119 (H) 02/18/2022    CREATININE 0.8 02/18/2022    WBC 4.10 02/18/2022    HGB 14.5 02/18/2022    HCT 43.2 02/18/2022     02/18/2022    INR 1.0 02/18/2022         ECG   Reviewed. 12/21/2021 -- NSR w/ PACs/PVCs    ASSESSMENT AND PLAN         Tobacco use  Pt is daily cigar smoker  Advised cessation, particularly in week leading up to surgery    Macular degeneration  Pt gets intra-vitreal injections every 4 weeks  Ongoing outpatient f/u    Primary osteoarthritis of left knee  L TKA planned for 3/1/2022  See below for statement in regards to perioperative risk  The patient was instructed to stop taking any NSAIDS per the surgeon's discretion  Recommend DVT prophylaxis at the discretion of the surgeon  Incentive spirometry and early ambulation post op  Post op bowel regimen  If present, recommend removal of Loredo catheter as early as possible to prevent infection  Monitor CBC, BMP, and volume status in the perioperative period    Macrocytosis without anemia  ; can be worked up as outpatient  Recheck post-op  Hgb normal       In regards to perioperative cardiac risk:  The patient denies any history of ischemic heart disease, denies any history of CHF, denies any history of CVA, is not on pre-operative treatment with insulin, and does not have a pre-operative creatinine > 2 mg/dL.   The Revised Cardiac Risk Index (RCRI) = 0 for this patient which indicates a 0.4% risk of  major adverse cardiac event in the perioperative period for this intermediate risk procedure.     PARTHA screening:  STOP-BANG screening for obstructive sleep apnea = 2, which indicates the patient is low risk for having underlying PARTHA.     Additional comments:  - no meds on AM of surgery  - stop all supplements 2 weeks prior to OR      Please do not hesitate to contact [facility] during the upcoming hospitalization with any questions or concerns.     Martha Victor MD  2/18/2022

## 2022-02-18 NOTE — ASSESSMENT & PLAN NOTE
L TKA planned for 3/1/2022  See below for statement in regards to perioperative risk  The patient was instructed to stop taking any NSAIDS per the surgeon's discretion  Recommend DVT prophylaxis at the discretion of the surgeon  Incentive spirometry and early ambulation post op  Post op bowel regimen  If present, recommend removal of Loredo catheter as early as possible to prevent infection  Monitor CBC, BMP, and volume status in the perioperative period

## 2022-02-25 ENCOUNTER — APPOINTMENT (OUTPATIENT)
Dept: LAB | Facility: HOSPITAL | Age: 57
End: 2022-02-25
Attending: ORTHOPAEDIC SURGERY
Payer: COMMERCIAL

## 2022-02-25 DIAGNOSIS — Z11.59 ENCOUNTER FOR SCREENING FOR OTHER VIRAL DISEASES: ICD-10-CM

## 2022-02-25 LAB — SARS-COV-2 RNA RESP QL NAA+PROBE: NEGATIVE

## 2022-02-25 PROCEDURE — U0003 INFECTIOUS AGENT DETECTION BY NUCLEIC ACID (DNA OR RNA); SEVERE ACUTE RESPIRATORY SYNDROME CORONAVIRUS 2 (SARS-COV-2) (CORONAVIRUS DISEASE [COVID-19]), AMPLIFIED PROBE TECHNIQUE, MAKING USE OF HIGH THROUGHPUT TECHNOLOGIES AS DESCRIBED BY CMS-2020-01-R: HCPCS

## 2022-02-25 PROCEDURE — C9803 HOPD COVID-19 SPEC COLLECT: HCPCS

## 2022-02-26 ENCOUNTER — ANESTHESIA EVENT (OUTPATIENT)
Dept: OPERATING ROOM | Facility: HOSPITAL | Age: 57
Setting detail: HOSPITAL OUTPATIENT SURGERY
End: 2022-02-26
Payer: COMMERCIAL

## 2022-03-01 ENCOUNTER — APPOINTMENT (OUTPATIENT)
Dept: RADIOLOGY | Facility: HOSPITAL | Age: 57
End: 2022-03-01
Attending: NURSE PRACTITIONER
Payer: COMMERCIAL

## 2022-03-01 ENCOUNTER — ANESTHESIA (OUTPATIENT)
Dept: OPERATING ROOM | Facility: HOSPITAL | Age: 57
Setting detail: HOSPITAL OUTPATIENT SURGERY
End: 2022-03-01
Payer: COMMERCIAL

## 2022-03-01 ENCOUNTER — HOSPITAL ENCOUNTER (OUTPATIENT)
Facility: HOSPITAL | Age: 57
Discharge: HOME HEALTH | End: 2022-03-02
Attending: ORTHOPAEDIC SURGERY | Admitting: ORTHOPAEDIC SURGERY
Payer: COMMERCIAL

## 2022-03-01 LAB
ABO + RH BLD: NORMAL
BLD GP AB SCN SERPL QL: NEGATIVE
D AG BLD QL: POSITIVE
LABORATORY COMMENT REPORT: NORMAL
SPECIMEN EXP DATE BLD: NORMAL

## 2022-03-01 PROCEDURE — 73560 X-RAY EXAM OF KNEE 1 OR 2: CPT | Mod: LT

## 2022-03-01 PROCEDURE — 63700000 HC SELF-ADMINISTRABLE DRUG: Performed by: NURSE PRACTITIONER

## 2022-03-01 PROCEDURE — 63600000 HC DRUGS/DETAIL CODE: Performed by: ORTHOPAEDIC SURGERY

## 2022-03-01 PROCEDURE — 36000016 HC OR LEVEL 6 EA ADDL MIN: Performed by: ORTHOPAEDIC SURGERY

## 2022-03-01 PROCEDURE — 36000006 HC OR LEVEL 6 INITIAL 30MIN: Performed by: ORTHOPAEDIC SURGERY

## 2022-03-01 PROCEDURE — 25800000 HC PHARMACY IV SOLUTIONS: Performed by: ORTHOPAEDIC SURGERY

## 2022-03-01 PROCEDURE — 37000010 HC ANESTHESIA SPINAL: Performed by: ORTHOPAEDIC SURGERY

## 2022-03-01 PROCEDURE — 63600000 HC DRUGS/DETAIL CODE: Performed by: NURSE ANESTHETIST, CERTIFIED REGISTERED

## 2022-03-01 PROCEDURE — 25000000 HC PHARMACY GENERAL: Performed by: NURSE PRACTITIONER

## 2022-03-01 PROCEDURE — 63600000 HC DRUGS/DETAIL CODE: Performed by: NURSE PRACTITIONER

## 2022-03-01 PROCEDURE — 71000011 HC PACU PHASE 1 EA ADDL MIN: Performed by: ORTHOPAEDIC SURGERY

## 2022-03-01 PROCEDURE — 25000000 HC PHARMACY GENERAL: Performed by: ANESTHESIOLOGY

## 2022-03-01 PROCEDURE — 25800000 HC PHARMACY IV SOLUTIONS: Performed by: NURSE PRACTITIONER

## 2022-03-01 PROCEDURE — C1776 JOINT DEVICE (IMPLANTABLE): HCPCS | Performed by: ORTHOPAEDIC SURGERY

## 2022-03-01 PROCEDURE — 27200000 HC STERILE SUPPLY: Performed by: ORTHOPAEDIC SURGERY

## 2022-03-01 PROCEDURE — 25000000 HC PHARMACY GENERAL: Mod: JW | Performed by: NURSE ANESTHETIST, CERTIFIED REGISTERED

## 2022-03-01 PROCEDURE — 25000000 HC PHARMACY GENERAL: Performed by: ORTHOPAEDIC SURGERY

## 2022-03-01 PROCEDURE — 71000001 HC PACU PHASE 1 INITIAL 30MIN: Performed by: ORTHOPAEDIC SURGERY

## 2022-03-01 PROCEDURE — 86850 RBC ANTIBODY SCREEN: CPT

## 2022-03-01 PROCEDURE — 36415 COLL VENOUS BLD VENIPUNCTURE: CPT | Performed by: ORTHOPAEDIC SURGERY

## 2022-03-01 PROCEDURE — 0SRD0J9 REPLACEMENT OF LEFT KNEE JOINT WITH SYNTHETIC SUBSTITUTE, CEMENTED, OPEN APPROACH: ICD-10-PCS | Performed by: ORTHOPAEDIC SURGERY

## 2022-03-01 DEVICE — IMPLANTABLE DEVICE: Type: IMPLANTABLE DEVICE | Site: KNEE | Status: FUNCTIONAL

## 2022-03-01 RX ORDER — HYDROMORPHONE HYDROCHLORIDE 1 MG/ML
0.5 INJECTION, SOLUTION INTRAMUSCULAR; INTRAVENOUS; SUBCUTANEOUS
Status: DISCONTINUED | OUTPATIENT
Start: 2022-03-01 | End: 2022-03-01 | Stop reason: HOSPADM

## 2022-03-01 RX ORDER — MIDAZOLAM HYDROCHLORIDE 2 MG/2ML
INJECTION, SOLUTION INTRAMUSCULAR; INTRAVENOUS
Status: COMPLETED
Start: 2022-03-01 | End: 2022-03-01

## 2022-03-01 RX ORDER — POLYETHYLENE GLYCOL 3350 17 G/17G
17 POWDER, FOR SOLUTION ORAL NIGHTLY PRN
Status: DISCONTINUED | OUTPATIENT
Start: 2022-03-01 | End: 2022-03-02 | Stop reason: HOSPADM

## 2022-03-01 RX ORDER — NAPROXEN SODIUM 220 MG/1
81 TABLET, FILM COATED ORAL 2 TIMES DAILY
Status: DISCONTINUED | OUTPATIENT
Start: 2022-03-01 | End: 2022-03-02 | Stop reason: HOSPADM

## 2022-03-01 RX ORDER — SODIUM CHLORIDE 9 MG/ML
125 INJECTION, SOLUTION INTRAVENOUS CONTINUOUS
Status: DISCONTINUED | OUTPATIENT
Start: 2022-03-01 | End: 2022-03-02

## 2022-03-01 RX ORDER — PANTOPRAZOLE SODIUM 40 MG/1
40 TABLET, DELAYED RELEASE ORAL DAILY
Status: DISCONTINUED | OUTPATIENT
Start: 2022-03-01 | End: 2022-03-02 | Stop reason: HOSPADM

## 2022-03-01 RX ORDER — DEXTROSE 40 %
15-30 GEL (GRAM) ORAL AS NEEDED
Status: DISCONTINUED | OUTPATIENT
Start: 2022-03-01 | End: 2022-03-02 | Stop reason: HOSPADM

## 2022-03-01 RX ORDER — DIPHENHYDRAMINE HCL 50 MG/ML
25 VIAL (ML) INJECTION EVERY 6 HOURS PRN
Status: DISCONTINUED | OUTPATIENT
Start: 2022-03-01 | End: 2022-03-02 | Stop reason: HOSPADM

## 2022-03-01 RX ORDER — ACETAMINOPHEN 325 MG/1
650 TABLET ORAL
Status: DISCONTINUED | OUTPATIENT
Start: 2022-03-01 | End: 2022-03-02 | Stop reason: HOSPADM

## 2022-03-01 RX ORDER — TRANEXAMIC ACID 10 MG/ML
1000 INJECTION, SOLUTION INTRAVENOUS ONCE
Status: COMPLETED | OUTPATIENT
Start: 2022-03-01 | End: 2022-03-01

## 2022-03-01 RX ORDER — ACETAMINOPHEN 325 MG/1
975 TABLET ORAL ONCE
Status: COMPLETED | OUTPATIENT
Start: 2022-03-01 | End: 2022-03-01

## 2022-03-01 RX ORDER — ONDANSETRON HYDROCHLORIDE 2 MG/ML
4 INJECTION, SOLUTION INTRAVENOUS
Status: DISCONTINUED | OUTPATIENT
Start: 2022-03-01 | End: 2022-03-01 | Stop reason: HOSPADM

## 2022-03-01 RX ORDER — CELECOXIB 200 MG/1
200 CAPSULE ORAL ONCE
Status: COMPLETED | OUTPATIENT
Start: 2022-03-01 | End: 2022-03-01

## 2022-03-01 RX ORDER — ALUMINUM HYDROXIDE, MAGNESIUM HYDROXIDE, AND SIMETHICONE 1200; 120; 1200 MG/30ML; MG/30ML; MG/30ML
30 SUSPENSION ORAL EVERY 4 HOURS PRN
Status: DISCONTINUED | OUTPATIENT
Start: 2022-03-01 | End: 2022-03-02 | Stop reason: HOSPADM

## 2022-03-01 RX ORDER — KETOROLAC TROMETHAMINE 15 MG/ML
7.5 INJECTION, SOLUTION INTRAMUSCULAR; INTRAVENOUS
Status: DISCONTINUED | OUTPATIENT
Start: 2022-03-01 | End: 2022-03-02 | Stop reason: HOSPADM

## 2022-03-01 RX ORDER — MORPHINE SULFATE 2 MG/ML
2 INJECTION, SOLUTION INTRAMUSCULAR; INTRAVENOUS EVERY 4 HOURS PRN
Status: DISCONTINUED | OUTPATIENT
Start: 2022-03-01 | End: 2022-03-02

## 2022-03-01 RX ORDER — BUPIVACAINE HYDROCHLORIDE 7.5 MG/ML
INJECTION, SOLUTION INTRASPINAL
Status: COMPLETED | OUTPATIENT
Start: 2022-03-01 | End: 2022-03-01

## 2022-03-01 RX ORDER — TIZANIDINE 2 MG/1
2 TABLET ORAL EVERY 6 HOURS PRN
Status: DISCONTINUED | OUTPATIENT
Start: 2022-03-01 | End: 2022-03-02 | Stop reason: HOSPADM

## 2022-03-01 RX ORDER — DIPHENHYDRAMINE HCL 25 MG
25 CAPSULE ORAL EVERY 6 HOURS PRN
Status: DISCONTINUED | OUTPATIENT
Start: 2022-03-01 | End: 2022-03-02 | Stop reason: HOSPADM

## 2022-03-01 RX ORDER — ONDANSETRON HYDROCHLORIDE 2 MG/ML
INJECTION, SOLUTION INTRAVENOUS AS NEEDED
Status: DISCONTINUED | OUTPATIENT
Start: 2022-03-01 | End: 2022-03-01 | Stop reason: SURG

## 2022-03-01 RX ORDER — CEFAZOLIN SODIUM/WATER 2 G/20 ML
2 SYRINGE (ML) INTRAVENOUS
Status: COMPLETED | OUTPATIENT
Start: 2022-03-01 | End: 2022-03-02

## 2022-03-01 RX ORDER — SODIUM CHLORIDE, SODIUM LACTATE, POTASSIUM CHLORIDE, CALCIUM CHLORIDE 600; 310; 30; 20 MG/100ML; MG/100ML; MG/100ML; MG/100ML
INJECTION, SOLUTION INTRAVENOUS CONTINUOUS
Status: DISCONTINUED | OUTPATIENT
Start: 2022-03-01 | End: 2022-03-01 | Stop reason: HOSPADM

## 2022-03-01 RX ORDER — LIDOCAINE HYDROCHLORIDE 20 MG/ML
INJECTION, SOLUTION EPIDURAL; INFILTRATION; INTRACAUDAL; PERINEURAL AS NEEDED
Status: DISCONTINUED | OUTPATIENT
Start: 2022-03-01 | End: 2022-03-01 | Stop reason: SURG

## 2022-03-01 RX ORDER — MIDAZOLAM HYDROCHLORIDE 2 MG/2ML
INJECTION, SOLUTION INTRAMUSCULAR; INTRAVENOUS AS NEEDED
Status: DISCONTINUED | OUTPATIENT
Start: 2022-03-01 | End: 2022-03-01 | Stop reason: SURG

## 2022-03-01 RX ORDER — ONDANSETRON HYDROCHLORIDE 2 MG/ML
4 INJECTION, SOLUTION INTRAVENOUS EVERY 8 HOURS PRN
Status: DISCONTINUED | OUTPATIENT
Start: 2022-03-01 | End: 2022-03-02 | Stop reason: HOSPADM

## 2022-03-01 RX ORDER — DEXAMETHASONE SODIUM PHOSPHATE 4 MG/ML
INJECTION, SOLUTION INTRA-ARTICULAR; INTRALESIONAL; INTRAMUSCULAR; INTRAVENOUS; SOFT TISSUE AS NEEDED
Status: DISCONTINUED | OUTPATIENT
Start: 2022-03-01 | End: 2022-03-01 | Stop reason: SURG

## 2022-03-01 RX ORDER — ONDANSETRON 4 MG/1
4 TABLET, ORALLY DISINTEGRATING ORAL EVERY 8 HOURS PRN
Status: DISCONTINUED | OUTPATIENT
Start: 2022-03-01 | End: 2022-03-02 | Stop reason: HOSPADM

## 2022-03-01 RX ORDER — SODIUM CHLORIDE, SODIUM LACTATE, POTASSIUM CHLORIDE, CALCIUM CHLORIDE 600; 310; 30; 20 MG/100ML; MG/100ML; MG/100ML; MG/100ML
INJECTION, SOLUTION INTRAVENOUS CONTINUOUS
Status: ACTIVE | OUTPATIENT
Start: 2022-03-01 | End: 2022-03-01

## 2022-03-01 RX ORDER — FENTANYL CITRATE 50 UG/ML
50 INJECTION, SOLUTION INTRAMUSCULAR; INTRAVENOUS
Status: DISCONTINUED | OUTPATIENT
Start: 2022-03-01 | End: 2022-03-01 | Stop reason: HOSPADM

## 2022-03-01 RX ORDER — VANCOMYCIN HYDROCHLORIDE
1.25
Status: COMPLETED | OUTPATIENT
Start: 2022-03-01 | End: 2022-03-01

## 2022-03-01 RX ORDER — OXYCODONE HYDROCHLORIDE 5 MG/1
5-10 TABLET ORAL EVERY 4 HOURS PRN
Status: DISCONTINUED | OUTPATIENT
Start: 2022-03-01 | End: 2022-03-02 | Stop reason: HOSPADM

## 2022-03-01 RX ORDER — DEXTROSE 50 % IN WATER (D50W) INTRAVENOUS SYRINGE
25 AS NEEDED
Status: DISCONTINUED | OUTPATIENT
Start: 2022-03-01 | End: 2022-03-02 | Stop reason: HOSPADM

## 2022-03-01 RX ORDER — IBUPROFEN 200 MG
16-32 TABLET ORAL AS NEEDED
Status: DISCONTINUED | OUTPATIENT
Start: 2022-03-01 | End: 2022-03-02 | Stop reason: HOSPADM

## 2022-03-01 RX ORDER — AMOXICILLIN 250 MG
1 CAPSULE ORAL 2 TIMES DAILY
Status: DISCONTINUED | OUTPATIENT
Start: 2022-03-01 | End: 2022-03-02 | Stop reason: HOSPADM

## 2022-03-01 RX ORDER — SODIUM CHLORIDE 0.9 G/100ML
INJECTION, SOLUTION IRRIGATION
Status: DISCONTINUED | OUTPATIENT
Start: 2022-03-01 | End: 2022-03-01 | Stop reason: HOSPADM

## 2022-03-01 RX ORDER — CEFAZOLIN SODIUM/WATER 2 G/20 ML
2 SYRINGE (ML) INTRAVENOUS
Status: COMPLETED | OUTPATIENT
Start: 2022-03-01 | End: 2022-03-01

## 2022-03-01 RX ORDER — PROPOFOL 10 MG/ML
INJECTION, EMULSION INTRAVENOUS CONTINUOUS PRN
Status: DISCONTINUED | OUTPATIENT
Start: 2022-03-01 | End: 2022-03-01 | Stop reason: SURG

## 2022-03-01 RX ADMIN — VANCOMYCIN HYDROCHLORIDE 1.25 G: 500 INJECTION, POWDER, LYOPHILIZED, FOR SOLUTION INTRAVENOUS at 13:18

## 2022-03-01 RX ADMIN — CEFAZOLIN 2 G: 330 INJECTION, POWDER, FOR SOLUTION INTRAMUSCULAR; INTRAVENOUS at 14:08

## 2022-03-01 RX ADMIN — CELECOXIB 200 MG: 200 CAPSULE ORAL at 11:01

## 2022-03-01 RX ADMIN — TRANEXAMIC ACID 1000 MG: 100 INJECTION, SOLUTION INTRAVENOUS at 13:18

## 2022-03-01 RX ADMIN — ONDANSETRON HYDROCHLORIDE 4 MG: 2 INJECTION, SOLUTION INTRAMUSCULAR; INTRAVENOUS at 15:20

## 2022-03-01 RX ADMIN — SODIUM CHLORIDE, POTASSIUM CHLORIDE, SODIUM LACTATE AND CALCIUM CHLORIDE: 600; 310; 30; 20 INJECTION, SOLUTION INTRAVENOUS at 14:41

## 2022-03-01 RX ADMIN — PROPOFOL INJECTABLE EMULSION 150 MCG/KG/MIN: 10 INJECTION, EMULSION INTRAVENOUS at 14:01

## 2022-03-01 RX ADMIN — ASPIRIN 81 MG CHEWABLE TABLET 81 MG: 81 TABLET CHEWABLE at 19:51

## 2022-03-01 RX ADMIN — PANTOPRAZOLE SODIUM 40 MG: 40 TABLET, DELAYED RELEASE ORAL at 19:51

## 2022-03-01 RX ADMIN — MIDAZOLAM HYDROCHLORIDE 1 MG: 1 INJECTION, SOLUTION INTRAMUSCULAR; INTRAVENOUS at 13:37

## 2022-03-01 RX ADMIN — SODIUM CHLORIDE, POTASSIUM CHLORIDE, SODIUM LACTATE AND CALCIUM CHLORIDE: 600; 310; 30; 20 INJECTION, SOLUTION INTRAVENOUS at 13:18

## 2022-03-01 RX ADMIN — SODIUM CHLORIDE 125 ML/HR: 9 INJECTION, SOLUTION INTRAVENOUS at 18:39

## 2022-03-01 RX ADMIN — KETOROLAC TROMETHAMINE 7.5 MG: 15 INJECTION, SOLUTION INTRAMUSCULAR; INTRAVENOUS at 19:50

## 2022-03-01 RX ADMIN — MIDAZOLAM HYDROCHLORIDE 3 MG: 1 INJECTION, SOLUTION INTRAMUSCULAR; INTRAVENOUS at 13:35

## 2022-03-01 RX ADMIN — ACETAMINOPHEN 650 MG: 325 TABLET ORAL at 19:49

## 2022-03-01 RX ADMIN — TRANEXAMIC ACID 1000 MG: 10 INJECTION, SOLUTION INTRAVENOUS at 15:53

## 2022-03-01 RX ADMIN — CEFAZOLIN 2 G: 10 INJECTION, POWDER, FOR SOLUTION INTRAVENOUS at 21:58

## 2022-03-01 RX ADMIN — LIDOCAINE HYDROCHLORIDE 3 ML: 20 INJECTION, SOLUTION EPIDURAL; INFILTRATION; INTRACAUDAL at 14:01

## 2022-03-01 RX ADMIN — ACETAMINOPHEN 975 MG: 325 TABLET ORAL at 11:01

## 2022-03-01 RX ADMIN — SENNOSIDES AND DOCUSATE SODIUM 1 TABLET: 50; 8.6 TABLET ORAL at 19:51

## 2022-03-01 RX ADMIN — BUPIVACAINE HYDROCHLORIDE IN DEXTROSE 2 ML: 7.5 INJECTION, SOLUTION SUBARACHNOID at 13:40

## 2022-03-01 RX ADMIN — DEXAMETHASONE SODIUM PHOSPHATE 8 MG: 4 INJECTION, SOLUTION INTRA-ARTICULAR; INTRALESIONAL; INTRAMUSCULAR; INTRAVENOUS; SOFT TISSUE at 14:01

## 2022-03-01 ASSESSMENT — PATIENT HEALTH QUESTIONNAIRE - PHQ9: SUM OF ALL RESPONSES TO PHQ9 QUESTIONS 1 & 2: 0

## 2022-03-01 ASSESSMENT — LIFESTYLE VARIABLES: TOBACCO_USE: 1

## 2022-03-01 NOTE — OR SURGEON
Pre-Procedure patient identification:  I am the primary operating surgeon/proceduralist and I have identified the patient and confirmed laterality is left on 03/01/22 at 12:57 PM Ignacio Antoine MD  Phone Number: [phone]

## 2022-03-01 NOTE — DISCHARGE INSTRUCTIONS
[facility]   Musa Ramirez M.D. OLIVIA Del Rosario M.D.   Ignacio Gupta M.D. Ignacio Lua M.D.   Zane Gao M.D. Juarez M.D.   Ike Gonzalez III, M.D. David T. Yucha, M.D. Craig G. Kriza, D.P.M., ELIAZAR. Ignacio Antoine M.D.   Post-Operative Instructions for Total Knee Replacement   We commend you on taking the first steps towards living an active independent lifestyle. We are here for support and to cheer you on but YOU play an important role in your rehabilitative road to recovery. We have developed the following guidelines to assist you as you navigate through this process.   Pain Control   • You were given prescriptions on discharge from the hospital, please get these filled as soon as possible. Some of your pain medication may be taken on a regular basis such as Gabapentin, Celebrex or Meloxicam. Other medications such as narcotics, should be taken as needed. Taking your medication before bedtime will help with sleep. Do not drink alcohol or drive while taking narcotic medication. If your pain is not relieved, or worsens, call us at the number listed below.     Stool Softener (Usually Colace)   • This helps to avoid constipation caused by the other medications. Take this 2 times per day for 2-4 weeks, or as long as you are taking narcotics. *If you are not constipated or you experience diarrhea, stop taking Colace!     Swelling   • Apply an ice pack/ice bag to your knee for twenty minutes every hour as needed throughout the day. Planning on at least three times daily. Always keep a layer of fabric between you and the ice. Keep you knee elevated as much as possible. If you were issued a cryotherapy unit, please follow instruction provided at discharge. The unit should remain between 39-40 degrees and remember to keep your skin protected.     Preventing Blood Clots:   • If you are not allergic to aspirin and you are not already taking blood thinners you  will be instructed to take one 81mg enteric coated Aspirin twice a day for four to six weeks post op. If you were prescribed a different blood thinner follow those instructions.   • The following symptoms may indicate the formation of a blood clot. If you notice any of these symptoms please call the office immediately:   o Calf is painful and feels warm to the touch.   o Persistent swelling, of the foot, ankle, or calf that does not go away with elevation of the leg.   o Chest pain or shortness of breath.     • [facility] / [address], [facility] / [facility] / [place] [address] / Fax [phone]   • [facility] / [address] / [address] / [place] [address] / Fax [phone]   • [facility] / [address] [place] [address], [address] / [place] [address] / Fax [phone]   • [address] [place] [address] / [address] / [place] [address] / Fax [phone]   • [facility] / [address] [facility] / [place] [address] / Fax [phone]   • [phone] or [phone]     Preventing Infection   • Preventing infection is extremely important for the rest of your life. Your new knee is artificial and does not have your body’s natural protection against infection. Bacteria from a variety of sources can enter your bloodstream and invade the area surrounding your new knee. This can eventually cause it to become loose and painful. A list of possible sources of infection follows, along with things you can do to minimize the risk to your new knee.   o Dental work: Cleaning, drilling, extraction, or root canal. Take antibiotics as prescribed prior to dental work for the first two years post op, after two years   o Signs of infection such as urinary tract, ear, or throat should be treated by your primary physician and followed appropriately.   o Any invasive procedure, where there is high risk of infection: Inform your doctor that you have an artificial joint and need to be given  antibiotics to protect it during these tests.     Wound Care   • As you leave the hospital, you will have a dressing over your surgical incision. Keep the dressing clean and dry. You can expect a small amount of blood on the dressing. You will see one of the three options below depending on your surgeon’s preference.     o Staple Closure: Remove your dressing at 2 days 5 days after surgery. After you remove the dressing, you will see staples; these will remain for 10-14 days and will be removed by the visiting nurse or at your follow up visit. After you have removed the dressing, you may shower but remember no baths or swimming. (You don’t want to soak the area until you have seen the doctor). After showering, pat the area dry and leave open to air.     o Prineo Closure: You are able to shower with this dressing on the 2nd day after surgery. This dressing must stay intact and should not be removed. This dressing will be removed by your surgeon at your initial post op visit around 10-14 days post operatively. You may shower but remember no baths or swimming. (You don’t want to soak the area until you have seen the doctor). After showering, pat the area dry and leave open to air.     o Surgical Glue Closure: Remove the bandage on the 2nd day after surgery. You may shower on the second day after surgery. Do not pick at the incision where the surgical glue is visible. The surgical glue will wear off over several weeks’ time. After you have removed the dressing, you may shower but remember no baths or swimming. (You don’t want to soak the area until you have seen the doctor). After showering, pat the area dry and leave open to air.     Activity   • Use your walker and bear weight on your leg as tolerated. Soon after discharge you will begin outpatient therapy via home care or at an outpatient facility where you will progress to a cane under their guidance.   • Do not sit for longer than 30-45 minutes at a time. Use  chairs with arms. You may nap if you are tired, but DO NOT stay in bed all day. Frequent short walks, either indoors or outdoors, are key to a successful recovery.   • You will experience discomfort in your operated knee and may have difficulty sleeping at night. This is part of the recovery process. It is important that you do your exercises even though your knee hurts when you move it. 20 minutes of icing after exercising is often helpful in decreasing the discomfort. The key to a successful recovery is movement - both exercise and walking.   • Sleep without a pillow under your knee. Keeping you knee in one position all night will undo all your hard work during the day. If you have been discharged with a leg support, it may be used at night or through the day intermittently as needed.   • You should do stairs with support. Do one step at a time - “good” knee up - “bad” knee down. Use a railing if available.   • You must have advanced to a cane, be off narcotics, and feel comfortable and safe before driving. You should consult with your physician at your first post op visit before driving, drive only if you feel safe.     Average recovery time   • 2-4 Weeks - Acute pain resolves; you may experience soreness, stiffness, swelling throughout the rehab period but it will begin to diminish.   • 3 Months - Most activities of daily living are performed comfortably. Soreness, stiffness, swelling begin to resolve. Kneeling on your knee is okay.   • Up to 2 Years - Full recovery - On the road to optimum recovery!     When to call you doctor   • Please contact your orthopaedic surgeon’s office if you notice any swelling, redness, large amount of drainage from the surgical site, develop a fever greater than 101?F, or excessive pain not relieved by your pain medication. Please contact us at [phone] or [phone].

## 2022-03-01 NOTE — ANESTHESIOLOGIST PRE-PROCEDURE ATTESTATION
Pre-Procedure Patient Identification:  I am the Primary Anesthesiologist and have identified the patient on 03/01/22 at 1:28 PM.   I have confirmed the procedure(s) will be performed by the following surgeon/proceduralist Ignacio Antoine MD.

## 2022-03-01 NOTE — ANESTHESIA PREPROCEDURE EVALUATION
Relevant Problems   MUSCULOSKELETAL   (+) Primary osteoarthritis of left knee       Anesthesia ROS/MED HX    Anesthesia History - neg  Pulmonary    history of tobacco use  Neuro/Psych - neg  Cardiovascular- neg   Covid19 Test Reviewed and ECG reviewed  Hematological - neg  GI/Hepatic- neg  Musculoskeletal   Osteoarthritis  Renal Disease- neg  Endo/Other- neg  Body Habitus: Overweight       Past Surgical History:   Procedure Laterality Date   • JOINT REPLACEMENT Right 2016    Hip       Physical Exam    Airway   Mallampati: II   TM distance: >3 FB   Neck ROM: full  Cardiovascular - normal   Rhythm: regular   Rate: normalPulmonary - normal   clear to auscultation  Dental - normal        Anesthesia Plan    Plan: spinal    Technique: spinal   ASA 2  Blood Products:   Use of Blood Products Discussed: No   Anesthetic plan and risks discussed with: patient  Postop Plan:   Patient Disposition: inpatient floor planned admission   Pain Management: IV analgesics

## 2022-03-01 NOTE — OP NOTE
OPERATION:  Procedure(s):  left total knee replacement  depuy.    SURGEON:  Ignacio Antoine MD    FIRST ASSISTANT:  Elvis    ANESTHESIA:  General    ANESTHESIOLOGIST: Anesthesiologist: Wayne De Dios MD  CRNA: Neris Howell CRNA; Emilie Espinoza CRNA      PREOPERATIVE DIAGNOSIS:  Left knee DJD.    POSTOPERATIVE DIAGNOSIS:    Same    INDICATIONS: The patient has severe degenerative joint disease of the knee that has exhausted nonoperative measures. Xrays showed joint space narrowing, osteophyte formation and subchondral cyst formation.    PROCEDURE: After the induction of satisfactory anesthetia the patient was prepped and draped for total knee arthroplasty in the usual sterile fashion. Surgery was commenced without the use of the tourniquet. A midline incision was made, developed into a classic medial subvastus approach. The patella was dislocated without eversion. Circumferential exposure of the tibia was accomplished. Meniscectomies and anterior cruciate ligament excision was performed. The tibial extramedullary guide was pinned into place and a 7 degree posterior sloping cut was made and then the femur was prepared with a 9.5 mm  hole in the femur and instrumentation was made with a 9 mm cut and a 5 degree valgus angle. The extension gap was 5 mm and the femur was sized to a size 7 femur. The block was pinned in place and cuts were made. Recessing cuts were made. The notch guide was put in place and notch cut was made. Posterior osteophytes were removed. The tibia was then sized to a size 7 tibial tray. This was pinned in place and the reamer and tamp and punch were utilized The femoral component was then inserted and a 5 mm spacer and this achieved full extension and equal flexion and extension gap balance with good stability.    The patella was debrided and periretinacular excision performed. The bone plug was filled for the hole in the intramedullary canal and then the components were  inserted in the order of tibia, femur, rotating platform. A local anesthetic cocktail with ropivicaine, clonidine and ketorolac was injected into the surrounding subcutaneous tissues and then after the cement had cured the knee was closed in flexion with #1 PDS in running locking fashion for the arthrotomy, absorbalble sutures  for the subcutaneous tissues and skin    Postoperative care plan in this patient's case is to mobilize weight-bearing as tolerated. he will be advanced in a rehab and strengthening program and seen in the office in 2 weeks for staple removal.      Ignacio Antoine MD

## 2022-03-01 NOTE — PROGRESS NOTES
Ortho Post OP Note    pt seen and examined in PACU; resting comfortably   denies pain; denies SOB/CP; denies N/V   pt awakens to verbal stimuli   VSS; no acute distress   LLE dressing CDI   +DP/PT pulses; feet warm, pink, brisk cap refill  Compartments soft/nontender and compressible  sensation and motor decreased secondary to spinal anesthesia    SCDs       A/P POD 0 Procedure(s) (LRB):  left total knee replacement  depuy (Left)  D/w Attending   post op Xray in PACU   pain management  DVT ppx: SCDS, early ambulation, ASA 81mg BID x 4 weeks   PT/OT OOB WBAT  Monitor I&Os   Neurovascular checks   IV abx post-op      medical management per  [facility]      Dispo: med-surg     MOTOR CHECK

## 2022-03-01 NOTE — ANESTHESIA PROCEDURE NOTES
Spinal Block    Patient location during procedure: OR  Start time: 3/1/2022 1:35 PM  End time: 3/1/2022 1:40 PM  Staffing  Performed: anesthesiologist   Anesthesiologist: Wayne De Dios MD  Reason for block: primary anesthetic  Preanesthetic Checklist  Completed: patient identified, site marked, surgical consent, pre-op evaluation, timeout performed, IV checked, risks and benefits discussed, monitors and equipment checked and sterile field maintained during procedure  Spinal Block  Patient position: sitting  Prep: ChloraPrep and site prepped and draped  Patient monitoring: heart rate, cardiac monitor, continuous pulse ox and blood pressure  Approach: midline  Location: L3-4  Injection technique: single-shot  Needle  Needle type: Sprotte   Needle gauge: 24 G  Needle length: 3.5 in  Assessment  Sensory level: T10  Events: cerebrospinal fluid  Additional Notes  Procedure well tolerated. Vital signs stable.    Medications Administered - 3/1/2022 1:40 PM   Bupivacaine PF (MARCAINE SPINAL) 0.75% intrathecal injection, 2 mL

## 2022-03-02 VITALS
RESPIRATION RATE: 20 BRPM | DIASTOLIC BLOOD PRESSURE: 68 MMHG | TEMPERATURE: 98.1 F | OXYGEN SATURATION: 100 % | HEART RATE: 96 BPM | WEIGHT: 186.9 LBS | SYSTOLIC BLOOD PRESSURE: 141 MMHG | BODY MASS INDEX: 28.33 KG/M2 | HEIGHT: 68 IN

## 2022-03-02 LAB
ANION GAP SERPL CALC-SCNC: 5 MEQ/L (ref 3–15)
BUN SERPL-MCNC: 12 MG/DL (ref 8–20)
CALCIUM SERPL-MCNC: 9.1 MG/DL (ref 8.9–10.3)
CHLORIDE SERPL-SCNC: 107 MEQ/L (ref 98–109)
CO2 SERPL-SCNC: 25 MEQ/L (ref 22–32)
CREAT SERPL-MCNC: 0.7 MG/DL (ref 0.8–1.3)
ERYTHROCYTE [DISTWIDTH] IN BLOOD BY AUTOMATED COUNT: 12.3 % (ref 11.6–14.4)
GFR SERPL CREATININE-BSD FRML MDRD: >60 ML/MIN/1.73M*2
GLUCOSE SERPL-MCNC: 125 MG/DL (ref 70–99)
HCT VFR BLDCO AUTO: 35.9 % (ref 40.1–51)
HGB BLD-MCNC: 12.1 G/DL (ref 13.7–17.5)
MCH RBC QN AUTO: 34.6 PG (ref 28–33.2)
MCHC RBC AUTO-ENTMCNC: 33.7 G/DL (ref 32.2–36.5)
MCV RBC AUTO: 102.6 FL (ref 83–98)
PDW BLD AUTO: 9 FL (ref 9.4–12.4)
PLATELET # BLD AUTO: 173 K/UL (ref 150–350)
POTASSIUM SERPL-SCNC: 4.5 MEQ/L (ref 3.6–5.1)
RBC # BLD AUTO: 3.5 M/UL (ref 4.5–5.8)
SODIUM SERPL-SCNC: 137 MEQ/L (ref 136–144)
WBC # BLD AUTO: 7.21 K/UL (ref 3.8–10.5)

## 2022-03-02 PROCEDURE — 97166 OT EVAL MOD COMPLEX 45 MIN: CPT | Mod: GO

## 2022-03-02 PROCEDURE — 97530 THERAPEUTIC ACTIVITIES: CPT | Mod: GP

## 2022-03-02 PROCEDURE — 63600000 HC DRUGS/DETAIL CODE: Performed by: NURSE PRACTITIONER

## 2022-03-02 PROCEDURE — 97161 PT EVAL LOW COMPLEX 20 MIN: CPT | Mod: GP

## 2022-03-02 PROCEDURE — 25000000 HC PHARMACY GENERAL: Performed by: NURSE PRACTITIONER

## 2022-03-02 PROCEDURE — 85027 COMPLETE CBC AUTOMATED: CPT | Performed by: NURSE PRACTITIONER

## 2022-03-02 PROCEDURE — 80048 BASIC METABOLIC PNL TOTAL CA: CPT | Performed by: NURSE PRACTITIONER

## 2022-03-02 PROCEDURE — 36415 COLL VENOUS BLD VENIPUNCTURE: CPT | Performed by: NURSE PRACTITIONER

## 2022-03-02 PROCEDURE — 63700000 HC SELF-ADMINISTRABLE DRUG: Performed by: NURSE PRACTITIONER

## 2022-03-02 RX ORDER — ACETAMINOPHEN 325 MG/1
650 TABLET ORAL
Qty: 8 TABLET | Refills: 0
Start: 2022-03-02 | End: 2022-03-03

## 2022-03-02 RX ORDER — NAPROXEN SODIUM 220 MG/1
81 TABLET, FILM COATED ORAL 2 TIMES DAILY
Qty: 60 TABLET | Refills: 0 | Status: SHIPPED | OUTPATIENT
Start: 2022-03-02 | End: 2023-11-09 | Stop reason: ALTCHOICE

## 2022-03-02 RX ORDER — TRAMADOL HYDROCHLORIDE 50 MG/1
50-100 TABLET ORAL EVERY 6 HOURS PRN
Qty: 20 TABLET | Refills: 0 | Status: SHIPPED | OUTPATIENT
Start: 2022-03-02 | End: 2022-03-07

## 2022-03-02 RX ORDER — TIZANIDINE 2 MG/1
2 TABLET ORAL EVERY 6 HOURS PRN
Qty: 10 TABLET | Refills: 0 | Status: SHIPPED | OUTPATIENT
Start: 2022-03-02 | End: 2022-03-11

## 2022-03-02 RX ORDER — AMOXICILLIN 250 MG
1 CAPSULE ORAL 2 TIMES DAILY
Qty: 60 TABLET | Refills: 0 | Status: SHIPPED | OUTPATIENT
Start: 2022-03-02 | End: 2023-11-09 | Stop reason: ALTCHOICE

## 2022-03-02 RX ADMIN — ACETAMINOPHEN 650 MG: 325 TABLET ORAL at 07:45

## 2022-03-02 RX ADMIN — CEFAZOLIN 2 G: 10 INJECTION, POWDER, FOR SOLUTION INTRAVENOUS at 05:35

## 2022-03-02 RX ADMIN — ASPIRIN 81 MG CHEWABLE TABLET 81 MG: 81 TABLET CHEWABLE at 07:45

## 2022-03-02 RX ADMIN — KETOROLAC TROMETHAMINE 7.5 MG: 15 INJECTION, SOLUTION INTRAMUSCULAR; INTRAVENOUS at 07:46

## 2022-03-02 RX ADMIN — ACETAMINOPHEN 650 MG: 325 TABLET ORAL at 00:51

## 2022-03-02 RX ADMIN — KETOROLAC TROMETHAMINE 7.5 MG: 15 INJECTION, SOLUTION INTRAMUSCULAR; INTRAVENOUS at 00:51

## 2022-03-02 RX ADMIN — SENNOSIDES AND DOCUSATE SODIUM 1 TABLET: 50; 8.6 TABLET ORAL at 07:46

## 2022-03-02 RX ADMIN — PANTOPRAZOLE SODIUM 40 MG: 40 TABLET, DELAYED RELEASE ORAL at 07:46

## 2022-03-02 ASSESSMENT — COGNITIVE AND FUNCTIONAL STATUS - GENERAL
DRESSING REGULAR LOWER BODY CLOTHING: 3 - A LITTLE
STANDING UP FROM CHAIR USING ARMS: 4 - NONE
CLIMB 3 TO 5 STEPS WITH RAILING: 4 - NONE
EATING MEALS: 4 - NONE
HELP NEEDED FOR BATHING: 3 - A LITTLE
AFFECT: WFL
TOILETING: 4 - NONE
HELP NEEDED FOR PERSONAL GROOMING: 4 - NONE
WALKING IN HOSPITAL ROOM: 4 - NONE
AFFECT: WFL
MOVING TO AND FROM BED TO CHAIR: 4 - NONE
DRESSING REGULAR UPPER BODY CLOTHING: 4 - NONE

## 2022-03-02 NOTE — PROGRESS NOTES
Patient:  Julian Palipagreg  Location:  [place] [address]  MRN:  [id number]  Today's date:  3/2/2022    Therapy communicated with RN regarding pt's appropriateness for therapy. RN approved pt for therapy session. Pt rec'd sitting in recliner in gym, agreeable to therapy. After session, pt was brought back to room by this OT, posey set, personal belongings and call bell within reach. RN notified of status/progress/positioning.    Julian is a 56 y.o. male admitted on 3/1/2022 with Osteoarthritis of left knee, unspecified osteoarthritis type [M17.12]  Osteoarthritis, localized, knee [M17.10]. Principal problem is No Principal Problem: There is no principal problem currently on the Problem List. Please update the Problem List and refresh..    Past Medical History  Julian has a past medical history of Arthritis, COVID-19 vaccine series completed, Macular degeneration, and Wears contact lenses.    History of Present Illness   Pt s/p L TKA 3/1/2022; WBAT      OT Vitals    Date/Time Pulse HR Source SpO2 O2 Therapy BP BP Location BP Method Pt Position Observations [facility]   03/02/22 0817 96 Monitor 100 % None (Room air) 141/68 Right upper arm Automatic Sitting Following PT session KISHA          Prior Living Environment      Most Recent Value   People in Home spouse   Current Living Arrangements home   Home Accessibility stairs to enter home (Group), stairs within home (Group)   Living Environment Comment Pt lives in 2SH with 1STE from the garage and 2STE from front entrance. [place] 1st fl, FF to 2nd fl comfort height toilet   Number of Stairs, Main Entrance 1, 2   Number of Stairs, Within Home, Primary other (see comments)  [Full flight]        Prior Level of Function      Most Recent Value   Ambulation independent   Transferring independent   Toileting independent   Bathing independent   Dressing independent   Eating independent   Prior Level of Function Comment Pt reports I PLOF with all ADLs and no use of AD. Pt works  full time,  +    Assistive Device Currently Used at Home none, other (see comments)  [has cane, comfort height toilets]        Occupational Profile      Most Recent Value   Reason for Services/Referral s/p L TKA   Occupational History/Life Experiences Currently works   Environmental Supports and Barriers Lives with supportive sp   Patient Goals to go home           OT Evaluation and Treatment - 03/02/22 1112        OT Time Calculation    Start Time 0813     Stop Time 0833     Time Calculation (min) 20 min        Session Details    Document Type initial evaluation     Mode of Treatment occupational therapy        General Information    Patient Profile Reviewed yes     Onset of Illness/Injury or Date of Surgery 03/01/22     Referring Physician Elina     General Observations of Patient Pt rec'd sitting in recliner in gym     Existing Precautions/Restrictions weight bearing     Limitations/Impairments safety/cognitive        Weight-bearing Status    Left LE Weight-Bearing Status weight-bearing as tolerated (WBAT)        Cognition/Psychosocial    Affect/Mental Status (Cognition) WFL     Orientation Status (Cognition) oriented x 4     Follows Commands (Cognition) WFL     Cognitive Function WFL;safety deficit     Safety Deficit (Cognition) moderate deficit;awareness of need for assistance;impulsivity;insight into deficits/self-awareness;judgment     Comment, Cognition Pt very impulsive/unsafe with AD. Otherwise pleasant and cooperative        Hearing Assessment    Hearing Status WFL        Vision Assessment/Intervention    Visual Impairment/Limitations corrective lenses full-time        Sensory Assessment (Somatosensory)    Sensory Assessment (Somatosensory) UE sensation intact        Range of Motion (ROM)    Range of Motion ROM is WFL;bilateral upper extremities        Strength (Manual Muscle Testing)    Strength (Manual Muscle Testing) strength is WFL;bilateral upper extremities        Transfers    Transfers  toilet transfer;shower transfer     Maintains Weight-Bearing Status (Transfers) able to maintain        Sit to Stand Transfer    [place], Sit to Stand Transfer independent     Verbal Cues safety     Assistive Device none     Comment No reliance on RW        Stand to Sit Transfer    [place], Stand to Sit Transfer independent     Verbal Cues safety     Assistive Device none     Comment No reliance on RW, good eccentric control        Toilet Transfer    Transfer Technique stand-sit;sit-stand     [place], Toilet Transfer modified independence     Verbal Cues safety;technique;proper use of assistive device     Assistive Device commode, 3-in-1        Shower Transfer    Transfer Technique step over, left entry     [place], Shower Transfer modified independence     Verbal Cues safety;technique     Assistive Device walker, front-wheeled     Comment Pt impulsive but demonstrated no difficulty        Safety Issues, Functional Mobility    Safety Issues Affecting Function (Mobility) impulsivity;insight into deficits/self-awareness;judgment;awareness of need for assistance        Balance    Balance Assessment sitting static balance;sitting dynamic balance;sit to stand dynamic balance;standing static balance;standing dynamic balance     Static Sitting Balance WFL     Dynamic Sitting Balance WFL     Sit to Stand Dynamic Balance WFL     Static Standing Balance WFL     Dynamic Standing Balance WFL;supported;unsupported        Functional Reach Test    Trial One: Functional Reach Test (in) 12 inches     Trial Two: Functional Reach Test (in) 10 inches     Average (in) 11 inches        Upper Body Dressing    Comment Pt reported no difficulty dressing in room before coming to gym        Lower Body Dressing    Comment Pt reports sp can assist at home        BADL Safety/Performance    Skilled BADL Treatment/Intervention BADL process/adaptation training;cognitive/safety deficit modifications     Progress in BADL Status  cueing required;level of supervision required        AM-PAC (TM) - ADL (Current Function)    Putting on and taking off regular lower body clothing? 3 - A Little     Bathing? 3 - A Little     Toileting? 4 - None     Putting on/taking off regular upper body clothing? 4 - None     How much help for taking care of personal grooming? 4 - None     Eating meals? 4 - None     AM-PAC (TM) ADL Score 22        Assessment/Plan (OT)    Daily Outcome Statement Pt seen for initial OT eval. Pt with minimal need for RW, however still educated/cued pt for appropriate use/placement of RW in prep for at home if pt's pain increases. Mod I-ind with transfers and ADL tasks this session and per pt, sp can assist upon d/c. Cleared by OT for d/c home with assist/home health.     Therapy Frequency evaluation only               OT Assessment/Plan      Most Recent Value   OT Recommended Discharge Disposition home with assistance, home with home health at 03/02/2022 1112   Anticipated Equipment Needs At Discharge (OT) none at 03/02/2022 1112   Patient/Family Therapy Goal Statement to go home at 03/02/2022 1112

## 2022-03-02 NOTE — PLAN OF CARE
Problem: Adult Inpatient Plan of Care  Goal: Plan of Care Review  Outcome: Progressing  Flowsheets (Taken 3/1/2022 1905)  Progress: improving  Plan of Care Reviewed With:   patient   spouse  Outcome Summary: Patient arrived 1845 to unit. Neurovascular checks WDL - tingling in RLE, numbness/tingling in LLE. Aquacel CDI. VSS. Patient tolerating regular diet. Pain 0/10. Oriented to room with call bell in reach. Wife at bedside     Problem: Postoperative Urinary Retention (Knee Arthroplasty)  Goal: Effective Urinary Elimination  Outcome: Progressing  Intervention: Monitor and Manage Urinary Retention  Flowsheets (Taken 3/1/2022 1851)  Urinary Elimination Promotion: catheter patency maintained    Problem: Pain (Knee Arthroplasty)  Goal: Acceptable Pain Control  Outcome: Progressing

## 2022-03-02 NOTE — PLAN OF CARE
Problem: Adjustment to Illness (Knee Arthroplasty)  Goal: Optimal Coping  Outcome: Met     Problem: Bleeding (Knee Arthroplasty)  Goal: Absence of Bleeding  Outcome: Met     Problem: Bowel Motility Impaired (Knee Arthroplasty)  Goal: Effective Bowel Elimination  Outcome: Met     Problem: Infection (Knee Arthroplasty)  Goal: Absence of Infection Signs and Symptoms  Outcome: Met     Problem: Joint Function Impaired (Knee Arthroplasty)  Goal: Optimal Functional Ability  Outcome: Met     Problem: Ongoing Anesthesia Effects (Knee Arthroplasty)  Goal: Anesthesia/Sedation Recovery  Outcome: Met     Problem: Pain (Knee Arthroplasty)  Goal: Acceptable Pain Control  Outcome: Met     Problem: Postoperative Nausea and Vomiting (Knee Arthroplasty)  Goal: Nausea and Vomiting Relief  Outcome: Met     Problem: Postoperative Urinary Retention (Knee Arthroplasty)  Goal: Effective Urinary Elimination  Outcome: Met     Problem: Adult Inpatient Plan of Care  Goal: Plan of Care Review  Outcome: Met  Goal: Patient-Specific Goal (Individualized)  Outcome: Met  Goal: Absence of Hospital-Acquired Illness or Injury  Outcome: Met  Goal: Optimal Comfort and Wellbeing  Outcome: Met  Goal: Readiness for Transition of Care  Outcome: Met     Problem: Mobility Impairment  Goal: Optimal Mobility  Outcome: Met

## 2022-03-02 NOTE — PLAN OF CARE
Problem: Adult Inpatient Plan of Care  Goal: Plan of Care Review  Outcome: Progressing  Flowsheets (Taken 3/2/2022 0915)  Plan of Care Reviewed With: patient  Outcome Summary: PT eitan elizalde. Pt Josefina - (I) for all functional mobility. Pt progressed from RW to SPC during session. Pt has assistance at home as needed and is cleared from PT with d/c recommendation home with outpatient PT.  Goal: Patient-Specific Goal (Individualized)  Outcome: Progressing  Flowsheets (Taken 3/2/2022 0915)  Patient-Specific Goals (Include Timeframe): To go home asap     Problem: Mobility Impairment  Goal: Optimal Mobility  Outcome: Progressing

## 2022-03-02 NOTE — PROGRESS NOTES
Ortho Post OP Note    Seen with Dr. Antoine   No acute events overnight   pt seen and examined, resting comfortably   pain well controlled  +void, oscar oral diet   pt awake, alert   VSS; no acute distress   LLE dressing CDI   +DP/PT pulses; feet warm, pink, brisk cap refill  Compartments soft/nontender and compressible  SILT, motor intact, +DF/PF/EHL     SCDs     Hgb 12.1     A/P POD 1 s/p LTKA (Dr. Antoine)   D/w attending  pain management  DVT ppx: SCDS, early ambulation, ASA 81mg BID x 4 weeks   PT/OT OOB WBAT  Monitor I&Os   Neurovascular checks   IV abx post-op       dispo: dc planning pending clearances     Follow up with Dr. Antoine in 2 weeks, call for appointment [phone]

## 2022-03-02 NOTE — NURSING NOTE
Pt discharged per MD order. Discharge instructions reviewed and given to pt. Verbalized understanding. Pt will be discharged to home. Waiting for his ride.

## 2022-03-02 NOTE — PLAN OF CARE
Problem: Adult Inpatient Plan of Care  Goal: Readiness for Transition of Care  Intervention: Mutually Develop Transition Plan  Flowsheets (Taken 3/2/2022 3261)  Anticipated Discharge Disposition: home with home health  Equipment Needed After Discharge: walker, front-wheeled  Discharge Coordination/Progress: See CM Note  Assistive Device/Animal Currently Used at Home: (has cane, comfort height toilets)   none   other (see comments)  Transportation Concerns: car, none  Readmission Within the Last 30 Days: no previous admission in last 30 days  Patient/Family Anticipated Services at Transition: home health care  Patient/Family Anticipates Transition to: home with help/services  Transportation Anticipated: family or friend will provide  Concerns to be Addressed: adjustment to diagnosis/illness  Patient's Choice of Community Agency(s):   declined list   [facility]  Offered/Gave Vendor List: yes  Type of Home Care Services: home PT    Case Management Note: Met with patient in the room, introduced self, verified ID, demographic info, and PCP.  PCP: does not have a PCP; declined PCP list  Pharmacy: [facility], [place]    Housing: Patient lives with spouse in a 2 story home with 2 steps to enter: 1/2 bath on first floor.     PLOF:  Patient is independent with ADL's.      DME: uses none; has cane and comfort height toilets  Will need to obtain walker from PT.    Anticipated Disposition:  Anticipate discharge to home with HC PT.  A list of providers that are participating in the Medicare program and are located in the desired geographic area was offered to the patient. Patient is aware that the list and ratings are available from the Medicare.gov website.  Declined list; wants to use [facility] for HC PT. Referral to [facility] liaison via text page and [facility].     Will continue to follow.    Plan of care discussed with patient; he will have a ride home.

## 2022-03-02 NOTE — PROGRESS NOTES
Patient: Julian Banda  Location:  [place] [address]  MRN:  [id number]  Today's date:  3/2/2022    Pt finished session seated in recliner in therapy gym, awaiting transport back to room. All immediate needs met.     Julian is a 56 y.o. male admitted on 3/1/2022 with Osteoarthritis of left knee, unspecified osteoarthritis type [M17.12]  Osteoarthritis, localized, knee [M17.10]. Principal problem is No Principal Problem: There is no principal problem currently on the Problem List. Please update the Problem List and refresh..    Past Medical History  Julian has a past medical history of Arthritis, COVID-19 vaccine series completed, Macular degeneration, and Wears contact lenses.    History of Present Illness   Pt s/p L TKA 3/1/2022; WBAT      PT Vitals    Date/Time Pulse HR Source SpO2 Pt Activity O2 Therapy BP BP Location BP Method Pt Position Observations Who   03/02/22 0757 101 Monitor 100 % At rest None (Room air) 128/80 Left upper arm Automatic Sitting -- KISHA   03/02/22 0817 96 Monitor 100 % -- None (Room air) 141/68 Right upper arm Automatic Sitting Following PT session KISHA      PT Pain    Date/Time Pain Type Side/Orientation Location Rating: Rest Rating: Activity Interventions [facility]   03/02/22 0757 Pain Assessment left knee 2 2 care clustered;position adjusted KISHA          Prior Living Environment      Most Recent Value   People in Home spouse   Current Living Arrangements home   Home Accessibility stairs to enter home (Group), stairs within home (Group)   Living Environment Comment Pt lives in 2SH with 1STE from the garage and 2STE from front enterance   Number of Stairs, Main Entrance 1, 2   Number of Stairs, Within Home, Primary other (see comments)  [Full flight]        Prior Level of Function      Most Recent Value   Ambulation independent   Transferring independent   Toileting independent   Bathing independent   Dressing independent   Eating independent   Prior Level of Function Comment Pt  reports I PLOF with all ADLs and no use of AD. Pt works full time,  +    Assistive Device Currently Used at Home none  [Pt has SPC]           PT Evaluation and Treatment - 03/02/22 0757        PT Time Calculation    Start Time 0757     Stop Time 0821     Time Calculation (min) 24 min        Session Details    Document Type initial evaluation     Mode of Treatment physical therapy        General Information    Patient Profile Reviewed yes     Onset of Illness/Injury or Date of Surgery 03/01/22     Referring Physician Elina     General Observations of Patient Pt received seated in recliner in therapy gym, very eager to go home.     Existing Precautions/Restrictions weight bearing        Weight-bearing Status    Left LE Weight-Bearing Status weight-bearing as tolerated (WBAT)        Living Environment    Primary Care Provided by self        Cognition/Psychosocial    Affect/Mental Status (Cognition) WFL     Orientation Status (Cognition) oriented x 4     Follows Commands (Cognition) WFL     Cognitive Function WFL        Sensory    Hearing Status WFL        Vision Assessment/Intervention    Visual Impairment/Limitations corrective lenses full-time        Sensory Assessment (Somatosensory)    Sensory Assessment (Somatosensory) LE sensation intact        Range of Motion (ROM)    Left Lower Extremity (ROM) left LE ROM is WFL except;knee     Knee, Left (ROM) AROM 5-90     Right Lower Extremity (ROM) right LE ROM is WFL        Strength (Manual Muscle Testing)    Left Lower Extremity Strength left LE strength is WFL except;knee     Knee, Left (Strength) 3+/5   within available range    Right Lower Extremity Strength right LE strength is WFL        Bed Mobility    Comment (Bed Mobility) NT - session OOB in therapy gym        Transfers    Transfers car transfer     Comment STS transfer x2; car transfer x1        Sit to Stand Transfer    [place], Sit to Stand Transfer independent     Assistive Device none         Stand to Sit Transfer    [place], Stand to Sit Transfer independent     Assistive Device none        Car Transfer    Transfer Technique sit-stand     [place], Car Transfer modified independence;verbal cues     Verbal Cues technique     Assistive Device walker, front-wheeled     Comment Pt instructed on technique for getting into/out of car; pt given RW to use however pt did not rely on walker        Gait Training    [place], Gait modified independence     Assistive Device cane, straight;walker, front-wheeled     Distance in Feet 350 feet   1x250' RW; 1x100' SPC    Pattern (Gait) step-through     Deviations/Abnormal Patterns (Gait) step length decreased;weight shifting decreased     Maintains Weight-bearing Status (Gait) able to maintain     Advanced Gait Activity 10 Meter Walk Test (Self-Selected Velocity)     Comment (Gait/Stairs) Pt ambulated 1x250' with RW and 1x100' with SPC. Pt demonstrated only mild gait deficits including a decreased LLE stance time and therefore shortened RLE step length. Pt was able to progress from RW to SPC during session and noted stability with SPC. Pt instructed on appropriate use of SPC AD.        10 Meter Walk Test (Self-Selected Velocity)    Trial One: Ten Meter Walk Test (sec) 6.9 seconds     Trial Two: Ten Meter Walk Test (sec) 7.7 seconds     Assistive Device walker, front-wheeled     Trial One: Gait Speed (m/s) 0.87 m/s     Trial Two: Gait Speed (m/s) 0.78 m/s     Average Gait Speed (m/s): Two Trials 0.83 m/s        Stairs Training    [place], Stairs modified independence     Assistive Device railing     Handrail Location (Stairs) right side (ascending);left side (descending)     Number of Stairs 4     Ascending Stairs Technique step-to-step     Descending Stairs Technique step-to-step     Maintains Weight-bearing Status (Stairs) able to maintain     Comment Pt instructed on technique for navigating stairs with one railing assist        Safety Issues,  Functional Mobility    Impairments Affecting Function (Mobility) range of motion (ROM);strength        Balance    Static Sitting Balance WFL     Dynamic Sitting Balance WFL     Static Standing Balance WFL     Dynamic Standing Balance WFL;supported   SPC       AM-PAC (TM) - Mobility (Current Function)    Turning from your back to your side while in a flat bed without using bedrails? 4 - None     Moving from lying on your back to sitting on the side of a flat bed without using bedrails? 4 - None     Moving to and from a bed to a chair? 4 - None     Standing up from a chair using your arms? 4 - None     To walk in a hospital room? 4 - None     Climbing 3-5 steps with a railing? 4 - None     AM-PAC (TM) Mobility Score 24        Assessment/Plan (PT)    Daily Outcome Statement PT eval completed. Pt Josefina- (I) for all functional mobility. Pt started session with RW AD and progressed to SPC 2/2 stability and comfort noted with functional activities. Pt reported feeling stable with SPC and was still able to ambulate safely. Pt has assistance at home if needed and is cleared from PT with d/c recommendation home with outpatient PT.     Rehab Potential other (see comments)   Pt cleared from PT    Therapy Frequency evaluation only               PT Assessment/Plan      Most Recent Value   PT Recommended Discharge Disposition home with outpatient services at 03/02/2022 0757   Anticipated Equipment Needs at Discharge (PT) none  [Pt has SPC] at 03/02/2022 0757   Patient/Family Therapy Goals Statement To go home today at 03/02/2022 0757                    Education Documentation  Treatment Plan, taught by Flora Worthington at 3/2/2022  9:08 AM.  Learner: Patient  Readiness: Acceptance  Method: Explanation  Response: Verbalizes Understanding  Comment: Pt educated on role of PT, PT POC, and safety with mobility and AD use.

## 2022-03-02 NOTE — PROGRESS NOTES
Orthopaedic Surgery  POST-OP CHECK    Procedures Performed During Hospitalization  Procedure(s):  left total knee replacement  depuy  Operative Date  3/1/2022  -------------------  Post-op Day: Day of Surgery    Subjective   Interval History  Patient seen and examined  Resting comfortably in bed  Pain well controlled  Doing well s/p Procedure(s):  left total knee replacement  depuy    Objective    Temp:  [36.1 °C (97 °F)-37.2 °C (99 °F)] 36.1 °C (97 °F)  Heart Rate:  [71-89] 89  Resp:  [11-20] 15  BP: (112-141)/(69-92) 141/92    Ins and Outs  I/O last 3 completed shifts:  In: 1775 [I.V.:1775]  Out: 100 [Blood:100]  I/O this shift:  In: -   Out: 1000 [Urine:1000]    General: NAD, AAOx3  Pulmonary: Non-labored breathing  Cardiovascular: Regular rate    Ortho Exam:  Left Lower Extremity:  Inspection - Compartments soft.   Dressing: Clean, dry, intact  Sensory - SILT in the Saphenous / Sural / Superficial Peroneal / Deep Peroneal / Plantar nerve distributions  Motor - +EHL/FHL/TA/Gs  Palpable DP pulse  Extremity is warm  Cap refill <2secs in all toes    Labs  Labs pending                    Assessment   Julian Banda is a 56 y.o. male Day of Surgery s/p L TKA (3/1/2022) - Dr. Antoine    Plan    - Weightbearing Status:   -- WBAT LLE  - DVT Prophylaxis: ASA 81 BID  - Antibiotics: Periop abx  - PT/OOB  - Pain control  - Medical mgmt  - Dispo: pending PT    Dane Chand MD  Orthopaedic Surgery  [phone]

## 2022-03-02 NOTE — PROGRESS NOTES
Home Care - Received home care referral for PT from Valorie Pritchett CM - patient is written for discharge. Record reviewed and met with patient. Discussed home care services - he is agreeable to PT. Gave patient written info./phone # for [facility]. Patient has a cane. Nurse, Maite, made aware of arrangements. Referral completed. Xenia Anguiano RN, . Addendum: Home Care benefits verified - once deductible is met ($941.31 remains), he will have $40 co-insurance per visit until his out of pocket is met. Left voice mail for patient on his cell informing of same with my call back # for questions.

## 2022-03-02 NOTE — PLAN OF CARE
Pt AAOx4, pt resting in the chair throughout the night.  Pain controlled.  Loredo taken out per protocol, pt due to void.  Fluids given to promote urination. Call bell within reach, safety precautions maintained.      Problem: Adjustment to Illness (Knee Arthroplasty)  Goal: Optimal Coping  Outcome: Progressing     Problem: Bleeding (Knee Arthroplasty)  Goal: Absence of Bleeding  Outcome: Progressing     Problem: Bowel Motility Impaired (Knee Arthroplasty)  Goal: Effective Bowel Elimination  Outcome: Progressing     Problem: Infection (Knee Arthroplasty)  Goal: Absence of Infection Signs and Symptoms  Outcome: Progressing     Problem: Joint Function Impaired (Knee Arthroplasty)  Goal: Optimal Functional Ability  Outcome: Progressing     Problem: Ongoing Anesthesia Effects (Knee Arthroplasty)  Goal: Anesthesia/Sedation Recovery  Outcome: Progressing     Problem: Pain (Knee Arthroplasty)  Goal: Acceptable Pain Control  Outcome: Progressing     Problem: Postoperative Nausea and Vomiting (Knee Arthroplasty)  Goal: Nausea and Vomiting Relief  Outcome: Progressing     Problem: Postoperative Urinary Retention (Knee Arthroplasty)  Goal: Effective Urinary Elimination  Outcome: Progressing     Problem: Adult Inpatient Plan of Care  Goal: Plan of Care Review  Outcome: Progressing  Goal: Patient-Specific Goal (Individualized)  Outcome: Progressing  Goal: Absence of Hospital-Acquired Illness or Injury  Outcome: Progressing  Goal: Optimal Comfort and Wellbeing  Outcome: Progressing  Goal: Readiness for Transition of Care  Outcome: Progressing

## 2022-03-02 NOTE — HOSPITAL COURSE
Julian is a 56 y.o. male admitted on 3/1/2022 with Osteoarthritis of left knee, unspecified osteoarthritis type [M17.12]  Osteoarthritis, localized, knee [M17.10]. Principal problem is No Principal Problem: There is no principal problem currently on the Problem List. Please update the Problem List and refresh..    Past Medical History  Julian has a past medical history of Arthritis, COVID-19 vaccine series completed, Macular degeneration, and Wears contact lenses.    History of Present Illness   Pt s/p L TKA 3/1/2022; WBAT

## 2022-03-02 NOTE — PERIOPERATIVE NURSING NOTE
Late entry: Last void at 10:30 am, bladder scanned for 760 cc. #14 Martiniquais martinez inserted per policy without event. Clear yellow urine returned.

## 2022-03-02 NOTE — PATIENT CARE CONFERENCE
Care Progression Rounds Note  Date: 3/2/2022  Time: 10:18 AM     Patient Name: Julian Banda     Medical Record Number: [id number]   YOB: 1965  Sex: Male      Room/Bed: 4613    Admitting Diagnosis: Osteoarthritis of left knee, unspecified osteoarthritis type [M17.12]  Osteoarthritis, localized, knee [M17.10]   Admit Date/Time: 3/1/2022 10:28 AM    Primary Diagnosis: No Principal Problem: There is no principal problem currently on the Problem List. Please update the Problem List and refresh.  Principal Problem: No Principal Problem: There is no principal problem currently on the Problem List. Please update the Problem List and refresh.    GMLOS: pending  Anticipated Discharge Date: 3/2/2022    AM-PAC:  Mobility Score: 24    Discharge Planning:  Current Living Arrangements: home  Concerns to be Addressed: adjustment to diagnosis/illness  Anticipated Discharge Disposition: home with home health  Type of Home Care Services: home PT    Barriers to Discharge:  None    Comments:       Participants:  ,nursing,occupational therapy,social work/services,physical therapy

## 2022-03-05 NOTE — ANESTHESIA POSTPROCEDURE EVALUATION
Patient: Julian Banda    Procedure Summary     Date: 03/01/22 Room / Location:  OR 2 / [facility] OR    Anesthesia Start: 1354 Anesthesia Stop: 1544    Procedure: left total knee replacement  depuy (Left ) Diagnosis:       Osteoarthritis of left knee, unspecified osteoarthritis type      (left knee oa)    Surgeons: Ignacio Antoine MD Responsible Provider: Wayne De Dios MD    Anesthesia Type: spinal ASA Status: 2          Anesthesia Type: spinal  PACU Vitals  3/1/2022 1537 - 3/1/2022 1637      3/1/2022  1545 3/1/2022  1550 3/1/2022  1600       BP: -- 115/71 112/69     Temp: 36.6 °C (97.9 °F) -- --     Pulse: 78 83 71     Resp: 11 20 11     SpO2: 99 % 98 % 99 %             Anesthesia Post Evaluation    Pain management: adequate  Patient location during evaluation: PACU  Patient participation: complete - patient participated  Level of consciousness: awake and alert  Cardiovascular status: acceptable  Airway Patency: adequate  Respiratory status: acceptable  Hydration status: acceptable  Anesthetic complications: no

## 2023-10-02 ENCOUNTER — HOSPITAL ENCOUNTER (EMERGENCY)
Facility: HOSPITAL | Age: 58
Discharge: HOME | End: 2023-10-02
Attending: EMERGENCY MEDICINE
Payer: COMMERCIAL

## 2023-10-02 ENCOUNTER — APPOINTMENT (EMERGENCY)
Dept: RADIOLOGY | Facility: HOSPITAL | Age: 58
End: 2023-10-02
Payer: COMMERCIAL

## 2023-10-02 VITALS
WEIGHT: 175 LBS | HEART RATE: 84 BPM | RESPIRATION RATE: 23 BRPM | HEIGHT: 68 IN | OXYGEN SATURATION: 97 % | BODY MASS INDEX: 26.52 KG/M2 | SYSTOLIC BLOOD PRESSURE: 124 MMHG | DIASTOLIC BLOOD PRESSURE: 79 MMHG | TEMPERATURE: 98.3 F

## 2023-10-02 DIAGNOSIS — I49.3 PVC (PREMATURE VENTRICULAR CONTRACTION): Primary | ICD-10-CM

## 2023-10-02 LAB
ALBUMIN SERPL-MCNC: 4.9 G/DL (ref 3.5–5.7)
ALP SERPL-CCNC: 62 IU/L (ref 34–125)
ALT SERPL-CCNC: 24 IU/L (ref 7–52)
ANION GAP SERPL CALC-SCNC: 7 MEQ/L (ref 3–15)
AST SERPL-CCNC: 63 IU/L (ref 13–39)
ATRIAL RATE: 88
BASOPHILS # BLD: 0.04 K/UL (ref 0.01–0.1)
BASOPHILS NFR BLD: 0.9 %
BILIRUB SERPL-MCNC: 0.5 MG/DL (ref 0.3–1.2)
BUN SERPL-MCNC: 9 MG/DL (ref 7–25)
CALCIUM SERPL-MCNC: 9.7 MG/DL (ref 8.6–10.3)
CHLORIDE SERPL-SCNC: 100 MEQ/L (ref 98–107)
CO2 SERPL-SCNC: 27 MEQ/L (ref 21–31)
CREAT SERPL-MCNC: 0.7 MG/DL (ref 0.7–1.3)
DIFFERENTIAL METHOD BLD: ABNORMAL
EOSINOPHIL # BLD: 0.11 K/UL (ref 0.04–0.54)
EOSINOPHIL NFR BLD: 2.4 %
ERYTHROCYTE [DISTWIDTH] IN BLOOD BY AUTOMATED COUNT: 13.6 % (ref 11.6–14.4)
GFR SERPL CREATININE-BSD FRML MDRD: >60 ML/MIN/1.73M*2
GLUCOSE SERPL-MCNC: 136 MG/DL (ref 70–99)
HCT VFR BLDCO AUTO: 41.1 % (ref 40.1–51)
HGB BLD-MCNC: 14.2 G/DL (ref 13.7–17.5)
IMM GRANULOCYTES # BLD AUTO: 0.01 K/UL (ref 0–0.08)
IMM GRANULOCYTES NFR BLD AUTO: 0.2 %
LYMPHOCYTES # BLD: 1.82 K/UL (ref 1.2–3.5)
LYMPHOCYTES NFR BLD: 40.4 %
MAGNESIUM SERPL-MCNC: 2.2 MG/DL (ref 1.8–2.5)
MCH RBC QN AUTO: 36.3 PG (ref 28–33.2)
MCHC RBC AUTO-ENTMCNC: 34.5 G/DL (ref 32.2–36.5)
MCV RBC AUTO: 105.1 FL (ref 83–98)
MONOCYTES # BLD: 0.45 K/UL (ref 0.3–1)
MONOCYTES NFR BLD: 10 %
NEUTROPHILS # BLD: 2.07 K/UL (ref 1.7–7)
NEUTS SEG NFR BLD: 46.1 %
NRBC BLD-RTO: 0 %
P AXIS: 96
PDW BLD AUTO: 10.2 FL (ref 9.4–12.4)
PLATELET # BLD AUTO: 242 K/UL (ref 150–350)
POTASSIUM SERPL-SCNC: 3.7 MEQ/L (ref 3.5–5.1)
POTASSIUM SERPL-SCNC: 5.8 MEQ/L (ref 3.5–5.1)
PR INTERVAL: 158
PROT SERPL-MCNC: 8.1 G/DL (ref 6–8.2)
QRS DURATION: 92
QT INTERVAL: 380
QTC CALCULATION(BAZETT): 459
R AXIS: 14
RBC # BLD AUTO: 3.91 M/UL (ref 4.5–5.8)
SODIUM SERPL-SCNC: 134 MEQ/L (ref 136–145)
T WAVE AXIS: 6
TROPONIN I SERPL HS-MCNC: 10 PG/ML
TROPONIN I SERPL HS-MCNC: 9.2 PG/ML
VENTRICULAR RATE: 88
WBC # BLD AUTO: 4.5 K/UL (ref 3.8–10.5)

## 2023-10-02 PROCEDURE — 83735 ASSAY OF MAGNESIUM: CPT | Performed by: EMERGENCY MEDICINE

## 2023-10-02 PROCEDURE — 99283 EMERGENCY DEPT VISIT LOW MDM: CPT | Mod: 25

## 2023-10-02 PROCEDURE — 80053 COMPREHEN METABOLIC PANEL: CPT

## 2023-10-02 PROCEDURE — 80053 COMPREHEN METABOLIC PANEL: CPT | Performed by: EMERGENCY MEDICINE

## 2023-10-02 PROCEDURE — 36415 COLL VENOUS BLD VENIPUNCTURE: CPT

## 2023-10-02 PROCEDURE — 71045 X-RAY EXAM CHEST 1 VIEW: CPT

## 2023-10-02 PROCEDURE — 84484 ASSAY OF TROPONIN QUANT: CPT | Mod: 91 | Performed by: EMERGENCY MEDICINE

## 2023-10-02 PROCEDURE — 84484 ASSAY OF TROPONIN QUANT: CPT | Performed by: EMERGENCY MEDICINE

## 2023-10-02 PROCEDURE — 93010 ELECTROCARDIOGRAM REPORT: CPT | Performed by: INTERNAL MEDICINE

## 2023-10-02 PROCEDURE — 85025 COMPLETE CBC W/AUTO DIFF WBC: CPT | Performed by: EMERGENCY MEDICINE

## 2023-10-02 PROCEDURE — 85025 COMPLETE CBC W/AUTO DIFF WBC: CPT

## 2023-10-02 PROCEDURE — 84484 ASSAY OF TROPONIN QUANT: CPT

## 2023-10-02 PROCEDURE — 93005 ELECTROCARDIOGRAM TRACING: CPT

## 2023-10-02 PROCEDURE — 93005 ELECTROCARDIOGRAM TRACING: CPT | Performed by: EMERGENCY MEDICINE

## 2023-10-03 LAB
ATRIAL RATE: 78
P AXIS: 91
PR INTERVAL: 164
QRS DURATION: 92
QT INTERVAL: 408
QTC CALCULATION(BAZETT): 465
R AXIS: 10
T WAVE AXIS: 17
VENTRICULAR RATE: 78

## 2023-10-03 PROCEDURE — 93010 ELECTROCARDIOGRAM REPORT: CPT | Performed by: INTERNAL MEDICINE

## 2023-10-03 ASSESSMENT — ENCOUNTER SYMPTOMS
SHORTNESS OF BREATH: 0
ABDOMINAL PAIN: 0
FEVER: 0
DIARRHEA: 0
WEAKNESS: 0
BRUISES/BLEEDS EASILY: 0
COLOR CHANGE: 0
COUGH: 0
NUMBNESS: 0
EYE PAIN: 0
VOMITING: 0

## 2023-10-03 NOTE — ED PROVIDER NOTES
Emergency Medicine Note  HPI   HISTORY OF PRESENT ILLNESS     58-year-old male with no significant past medical history presents for evaluation after he was found to have a irregular heart rhythm prior to cataract surgery.  Patient denies any complaints.  No palpitations, chest pain, shortness of breath.  No prior history of heart arrhythmias.  Patient does endorse daily alcohol use, moderate amounts of caffeine.            Patient History   PAST HISTORY     Reviewed from Nursing Triage:       Past Medical History:   Diagnosis Date    Arthritis     Left Knee and Right Hip     COVID-19 vaccine series completed     Pf[employer] X 3     Macular degeneration     Wears contact lenses        Past Surgical History:   Procedure Laterality Date    JOINT REPLACEMENT Right 2016    Hip       No family history on file.    Social History     Tobacco Use    Smoking status: Some Days     Types: Cigars    Smokeless tobacco: Never    Tobacco comments:     Ocassional Cigar    Vaping Use    Vaping Use: Never used   Substance Use Topics    Alcohol use: Yes     Alcohol/week: 14.0 standard drinks of alcohol     Types: 14 Glasses of wine per week     Comment: Daily 2 glasses Wine     Drug use: Never         Review of Systems   REVIEW OF SYSTEMS     Review of Systems   Constitutional: Negative for fever.   Eyes: Negative for pain.   Respiratory: Negative for cough and shortness of breath.    Cardiovascular: Negative for chest pain.   Gastrointestinal: Negative for abdominal pain, diarrhea and vomiting.   Skin: Negative for color change and rash.   Neurological: Negative for weakness and numbness.   Hematological: Does not bruise/bleed easily.         VITALS     ED Vitals    Date/Time Temp Pulse Resp BP SpO2 [facility]   10/02/23 1800 -- 84 23 124/79 97 %    10/02/23 1700 -- 88 19 130/84 99 %    10/02/23 1624 -- 88 21 127/82 100 %    10/02/23 1615 -- 92 18 142/76 99 %    10/02/23 1134 36.8 °C (98.3 °F) 85 18 158/80 100 % LAC         Pulse Ox %: 99 % (10/02/23 1739)  Pulse Ox Interpretation: Normal (10/02/23 1739)  Heart Rate: 88 (10/02/23 1739)  Rhythm Strip Interpretation: Normal Sinus Rhythm (10/02/23 1739)     Physical Exam   PHYSICAL EXAM     Physical Exam  Vitals and nursing note reviewed.   Constitutional:       General: He is not in acute distress.     Appearance: He is well-developed.   HENT:      Head: Atraumatic.   Eyes:      Extraocular Movements: Extraocular movements intact.      Conjunctiva/sclera: Conjunctivae normal.   Cardiovascular:      Rate and Rhythm: Normal rate and regular rhythm.      Pulses: Normal pulses.   Pulmonary:      Effort: Pulmonary effort is normal. No respiratory distress.   Abdominal:      General: Abdomen is flat.      Palpations: Abdomen is soft.   Musculoskeletal:         General: No deformity.   Skin:     General: Skin is warm and dry.   Neurological:      Mental Status: He is alert. Mental status is at baseline.   Psychiatric:         Behavior: Behavior normal.           PROCEDURES     Procedures     DATA     Results     Procedure Component Value Units Date/Time    Potassium [[id number]]  (Normal) Collected: 10/02/23 1624    Specimen: Blood, Venous Updated: 10/02/23 1806     Potassium 3.7 mEQ/L      Comment: Results obtained on plasma. Plasma Potassium values may be up to 0.4 mEQ/L less than serum values. The differences may be greater for patients with high platelet or white cell counts.       Magnesium [[id number]]  (Normal) Collected: 10/02/23 1624    Specimen: Blood, Venous Updated: 10/02/23 1802     Magnesium 2.2 mg/dL     Comprehensive metabolic panel [[id number]]  (Abnormal) Collected: 10/02/23 1144    Specimen: Blood, Venous Updated: 10/02/23 1229     Sodium 134 mEQ/L      Comment: Moderate hemolysis, results may be affected.         Potassium 5.8 mEQ/L      Comment: Results obtained on plasma. Plasma Potassium values may be up to 0.4 mEQ/L less than serum values. The differences may be greater  for patients with high platelet or white cell counts.  Moderate hemolysis, results may be affected.         Chloride 100 mEQ/L      CO2 27 mEQ/L      BUN 9 mg/dL      Comment: Moderate lipemia, results may be affected.         Creatinine 0.7 mg/dL      Glucose 136 mg/dL      Calcium 9.7 mg/dL      AST (SGOT) 63 IU/L      Comment: Moderate hemolysis, results may be affected.   Moderate lipemia, results may be affected.         ALT (SGPT) 24 IU/L      Comment: Moderate lipemia, results may be affected.         Alkaline Phosphatase 62 IU/L      Comment: Moderate hemolysis, results may be affected.         Total Protein 8.1 g/dL      Comment: Test performed on plasma which typically contains approximately 0.4 g/dL more protein than serum.        Albumin 4.9 g/dL      Comment: Moderate hemolysis, results may be affected.         Bilirubin, Total 0.5 mg/dL      eGFR >60.0 mL/min/1.73m*2      Anion Gap 7 mEQ/L     HS Troponin I (with 2 hour reflex) [[id number]]  (Normal) Collected: 10/02/23 1144    Specimen: Blood, Venous Updated: 10/02/23 1227     High Sens Troponin I 9.2 pg/mL     CBC and differential [[id number]]  (Abnormal) Collected: 10/02/23 1144    Specimen: Blood, Venous Updated: 10/02/23 1206     WBC 4.50 K/uL      RBC 3.91 M/uL      Hemoglobin 14.2 g/dL      Hematocrit 41.1 %      .1 fL      MCH 36.3 pg      MCHC 34.5 g/dL      RDW 13.6 %      Platelets 242 K/uL      Comment: RESULTS OBTAINED AFTER VORTEXING TO ELIMINATE PLT CLUMPSSPECIMEN QUALITY CHECKEDRESULTS CHECKED        MPV 10.2 fL      Differential Type Auto     nRBC 0.0 %      Immature Granulocytes 0.2 %      Neutrophils 46.1 %      Lymphocytes 40.4 %      Monocytes 10.0 %      Eosinophils 2.4 %      Basophils 0.9 %      Immature Granulocytes, Absolute 0.01 K/uL      Neutrophils, Absolute 2.07 K/uL      Lymphocytes, Absolute 1.82 K/uL      Monocytes, Absolute 0.45 K/uL      Eosinophils, Absolute 0.11 K/uL      Basophils, Absolute 0.04 K/uL            Imaging Results          X-RAY CHEST 1 VIEW (Edited Result - FINAL)  Result time 10/02/23 14:48:36    Addendum (preliminary) 1 of 1    JIMBO Alfaro in the ER [phone] was called at 2:20 pm on 10/2/23 and advised radiology  results are now available for viewing.               Final result                 Impression:    IMPRESSION:    No acute cardiopulmonary disease.    Nodular densities as described for which follow-up is recommended.  Please see  comment.             Narrative:    CLINICAL HISTORY: Chest Pain/Arrhythmia    COMPARISON:  None.    COMMENT:  Technique: Frontal view chest x-ray.    There is no focal consolidation, pleural effusion or pulmonary edema. There are  probable bilateral nipple shadows noted.  However, there is suggestion for an  additional nodular density projecting over the right anterior seventh rib  measuring about 1.7 cm which is probably artifactual however, correlation with  nonemergent noncontrast CT chest is recommended.  The cardiomediastinal  silhouette is satisfactory.                                ECG 12 lead      ECG 12 lead    (Results Pending)       Scoring tools                                  ED Course & MDM   MDM / ED COURSE / CLINICAL IMPRESSION / DISPO     Medical Decision Making  Patient with PVCs on EKG.  Asymptomatic.  Labs including electrolytes normal.  Recommend outpatient follow-up with cardiology/EP for evaluation.  Patient also counseled on health maintenance and should follow-up with a primary care doctor.    Amount and/or Complexity of Data Reviewed  Labs: ordered.  Radiology: ordered and independent interpretation performed.     Details: EKG 1 -sinus rhythm at 80 bpm, run of bigeminy.  No acute signs of ischemia.  EKG 2 normal sinus rhythm at 78 no ST elevations or depressions, no acute signs of ischemia.  Beats per minute.  ECG/medicine tests: ordered.             Clinical Impression      PVC (premature ventricular contraction)     _________________     ED  Disposition   Discharge                   Ted Scott MD  10/03/23 4504

## 2023-11-09 ENCOUNTER — OFFICE VISIT (OUTPATIENT)
Dept: CARDIOLOGY | Facility: CLINIC | Age: 58
End: 2023-11-09
Payer: COMMERCIAL

## 2023-11-09 VITALS
HEART RATE: 91 BPM | HEIGHT: 68 IN | BODY MASS INDEX: 27.58 KG/M2 | SYSTOLIC BLOOD PRESSURE: 150 MMHG | WEIGHT: 182 LBS | DIASTOLIC BLOOD PRESSURE: 84 MMHG | OXYGEN SATURATION: 97 %

## 2023-11-09 DIAGNOSIS — R94.31 ABNORMAL EKG: Primary | ICD-10-CM

## 2023-11-09 DIAGNOSIS — Z13.220 LIPID SCREENING: ICD-10-CM

## 2023-11-09 DIAGNOSIS — Z72.0 TOBACCO USE: ICD-10-CM

## 2023-11-09 DIAGNOSIS — I49.3 PVC (PREMATURE VENTRICULAR CONTRACTION): ICD-10-CM

## 2023-11-09 DIAGNOSIS — R03.0 ELEVATED BLOOD-PRESSURE READING WITHOUT DIAGNOSIS OF HYPERTENSION: ICD-10-CM

## 2023-11-09 DIAGNOSIS — Z01.810 PREOP CARDIOVASCULAR EXAM: ICD-10-CM

## 2023-11-09 LAB
ATRIAL RATE: 77
P AXIS: 79
PR INTERVAL: 154
QRS DURATION: 86
QT INTERVAL: 378
QTC CALCULATION(BAZETT): 427
R AXIS: 65
T WAVE AXIS: 23
VENTRICULAR RATE: 77

## 2023-11-09 PROCEDURE — 99205 OFFICE O/P NEW HI 60 MIN: CPT | Performed by: INTERNAL MEDICINE

## 2023-11-09 PROCEDURE — 93000 ELECTROCARDIOGRAM COMPLETE: CPT | Performed by: INTERNAL MEDICINE

## 2023-11-09 PROCEDURE — 3008F BODY MASS INDEX DOCD: CPT | Performed by: INTERNAL MEDICINE

## 2023-11-09 ASSESSMENT — ENCOUNTER SYMPTOMS
IRREGULAR HEARTBEAT: 0
DYSPNEA ON EXERTION: 0
DIZZINESS: 0
SLEEP DISTURBANCES DUE TO BREATHING: 0
LIGHT-HEADEDNESS: 0
PALPITATIONS: 0
SNORING: 1
SHORTNESS OF BREATH: 0

## 2023-11-09 NOTE — ASSESSMENT & PLAN NOTE
Denies hx of HTN  150/84 in office  I asked him to start checking BP at home and report elevations. F/u 3 mo with TTE advised

## 2023-11-09 NOTE — ASSESSMENT & PLAN NOTE
Tobacco Use Cessation Counseling  Social History     Tobacco Use   Smoking Status Some Days    Types: Cigars   Smokeless Tobacco Never   Tobacco Comments    Ocassional Cigar      Advised patient to discontinue use of tobacco.  Discussed the following risks associated with tobacco use: myocardial infarction, stroke  Patient's stage of change: contemplation  Discussed the following potential methods, skills and resources: counseling for smoking cessation  medication - bupropion (Zyban)  medication - nicotine replacement - gum  medication - nicotine replacement - patch  Discussed need to set a quit date: Advised patient to set a quit date  Suggested patient follow up with me in 3-6 months  Discussion Time: 5 minutes

## 2023-11-09 NOTE — ASSESSMENT & PLAN NOTE
Plan is for R cataract surgery on 11/22/23 with opthalmology   - Pt has no active cardiopulmonary symptoms  - Pt has adequate functional capacity, able to walk 2 blocks or climb a flight of stairs without cardiopulmonary limitation at recent baseline. He rows and/or walks 6500 steps daily  - This procedure is not included in the NSQIP surgical database and so the Gil and other modern non-cardiac surgical risk calculators are not applicable. However:   - In historic registries, these were found to be low risk procedures (<1% risk major adverse cardiac event [MACE] in all comers)  - Given low risk of MACE (<1%) and absence of acute cardiac decompensation; additional testing or intervention from our standpoint would not     Pt is an appropriate candidate from our perspective

## 2023-11-09 NOTE — ASSESSMENT & PLAN NOTE
Frequent PVCs during recent ER visit  Advised to f/u in cardiology clinic and he is here today for eval of this

## 2023-11-09 NOTE — PATIENT INSTRUCTIONS
Please start checking blood pressure a few times a week. Bring in your cuff and readings to your next appointment. If you do not already have a cuff, we recommend an Omron upper arm cuff.     Please message/call if your blood pressure readings are consistently above your goal of <130/80 and we can adjust medications as needed.

## 2024-02-08 LAB
CHOLEST SERPL-MCNC: 185 MG/DL
CHOLEST/HDLC SERPL: 2.8 (CALC)
CRP SERPL HS-MCNC: 1.1 MG/L
HBA1C MFR BLD: 4.8 % OF TOTAL HGB
HDLC SERPL-MCNC: 67 MG/DL
LDLC SERPL CALC-MCNC: 88 MG/DL (CALC)
LPA SERPL-SCNC: 32 NMOL/L
NONHDLC SERPL-MCNC: 118 MG/DL (CALC)
TRIGL SERPL-MCNC: 198 MG/DL

## (undated) DEVICE — BANDAGE ESMARK NON LATEX STER 6X9

## (undated) DEVICE — STOCKINETTE IMPRVS LG STRL 12X48

## (undated) DEVICE — SUTURE MONOCRYL 4-0 PS-1 Y682H

## (undated) DEVICE — NEEDLE DISP SPINAL 22GX3-1/2IN

## (undated) DEVICE — ***USE 138850*** SUTURE PDS II 1 Z371T CTX

## (undated) DEVICE — SPONGE LAP 18X18 SAFE-T RFID ENHANCED XRAY

## (undated) DEVICE — BLADE RECIPROCATING DBL SIDED

## (undated) DEVICE — SUTURE QUILL MONODERM 2-0 YA-2024Q

## (undated) DEVICE — DRESSING AQUACEL AG 12 INCH

## (undated) DEVICE — COVER CAMERA LIGHT HANDLE

## (undated) DEVICE — ***USE 140734***PACK TOTAL KNEE

## (undated) DEVICE — UNDERPAD DURASORB 30 X 30

## (undated) DEVICE — HANDPIECE SUCTION INTERPULSE W/BONE CLEANING TIP

## (undated) DEVICE — STAPLER SKIN

## (undated) DEVICE — BLADE SCALPEL #10

## (undated) DEVICE — BLADE SAGITTAL #127 STABLECUT

## (undated) DEVICE — PENEVAC1 NONSTICK SMOKE EVAC

## (undated) DEVICE — MANIFOLD FOUR PORT NEPTUNE

## (undated) DEVICE — GLOVE PROTEXIS PI ORTHO 8.5

## (undated) DEVICE — COVER MAYO STAND

## (undated) DEVICE — COVER LIGHTHANDLE

## (undated) DEVICE — ADHESIVE SKIN LIQUIBAND EXCEED .8GM

## (undated) DEVICE — PARTICLES HEMOSTATIC ARISTA 1 GRAM

## (undated) DEVICE — LABEL MEDICATION NEUO ORTHO

## (undated) DEVICE — SOLUTION PROV-IODINE .75 OZ

## (undated) DEVICE — APPLICATOR CHLORAPREP 26ML ORANGE TINT

## (undated) DEVICE — APPLICATOR FLEXIBLE FOR ARISTA

## (undated) DEVICE — SYRINGE DISP LUER-LOK 20 CC

## (undated) DEVICE — SUTURE MONOCRYL 2-0 Y266H

## (undated) DEVICE — SOLN IRRIG .9%SOD 3L

## (undated) DEVICE — PACK OR TOWEL

## (undated) DEVICE — DRAPE HALF STERILE

## (undated) DEVICE — TIP SUCTION YANKAUER